# Patient Record
Sex: MALE | Race: WHITE | NOT HISPANIC OR LATINO | Employment: OTHER | URBAN - METROPOLITAN AREA
[De-identification: names, ages, dates, MRNs, and addresses within clinical notes are randomized per-mention and may not be internally consistent; named-entity substitution may affect disease eponyms.]

---

## 2018-03-16 ENCOUNTER — OFFICE VISIT (OUTPATIENT)
Dept: ENDOCRINOLOGY | Facility: HOSPITAL | Age: 48
End: 2018-03-16
Payer: MEDICARE

## 2018-03-16 VITALS
HEIGHT: 73 IN | WEIGHT: 198.4 LBS | HEART RATE: 76 BPM | DIASTOLIC BLOOD PRESSURE: 66 MMHG | SYSTOLIC BLOOD PRESSURE: 110 MMHG | BODY MASS INDEX: 26.29 KG/M2

## 2018-03-16 DIAGNOSIS — E11.69 TYPE 2 DIABETES MELLITUS WITH OTHER SPECIFIED COMPLICATION, WITH LONG-TERM CURRENT USE OF INSULIN (HCC): ICD-10-CM

## 2018-03-16 DIAGNOSIS — E78.5 HYPERLIPIDEMIA, UNSPECIFIED HYPERLIPIDEMIA TYPE: ICD-10-CM

## 2018-03-16 DIAGNOSIS — I10 HYPERTENSION, UNSPECIFIED TYPE: Primary | ICD-10-CM

## 2018-03-16 DIAGNOSIS — E11.49 OTHER DIABETIC NEUROLOGICAL COMPLICATION ASSOCIATED WITH TYPE 2 DIABETES MELLITUS (HCC): ICD-10-CM

## 2018-03-16 DIAGNOSIS — Z79.4 TYPE 2 DIABETES MELLITUS WITH OTHER SPECIFIED COMPLICATION, WITH LONG-TERM CURRENT USE OF INSULIN (HCC): ICD-10-CM

## 2018-03-16 PROBLEM — E11.40 DIABETIC NEUROPATHY ASSOCIATED WITH TYPE 2 DIABETES MELLITUS (HCC): Status: ACTIVE | Noted: 2018-03-16

## 2018-03-16 PROBLEM — E11.9 LONG-TERM INSULIN USE IN TYPE 2 DIABETES (HCC): Status: ACTIVE | Noted: 2018-03-16

## 2018-03-16 PROCEDURE — 99214 OFFICE O/P EST MOD 30 MIN: CPT | Performed by: INTERNAL MEDICINE

## 2018-03-16 RX ORDER — INSULIN GLARGINE 100 [IU]/ML
16 INJECTION, SOLUTION SUBCUTANEOUS
Qty: 5 PEN | Refills: 3 | Status: SHIPPED | OUTPATIENT
Start: 2018-03-16 | End: 2018-05-16 | Stop reason: SDUPTHER

## 2018-03-16 RX ORDER — SIMVASTATIN 40 MG
TABLET ORAL
COMMUNITY
Start: 2018-02-13 | End: 2018-08-03 | Stop reason: SDUPTHER

## 2018-03-16 RX ORDER — CALCIUM CITRATE/VITAMIN D3 200MG-6.25
TABLET ORAL
Qty: 200 EACH | Refills: 6 | Status: SHIPPED | OUTPATIENT
Start: 2018-03-16 | End: 2018-11-15 | Stop reason: SDUPTHER

## 2018-03-16 RX ORDER — CITALOPRAM 20 MG/1
20 TABLET ORAL DAILY
COMMUNITY
Start: 2018-03-15

## 2018-03-16 RX ORDER — HYDROXYZINE PAMOATE 50 MG/1
50 CAPSULE ORAL
Refills: 0 | COMMUNITY
Start: 2018-02-24 | End: 2019-03-20 | Stop reason: ALTCHOICE

## 2018-03-16 RX ORDER — INSULIN GLARGINE 100 [IU]/ML
INJECTION, SOLUTION SUBCUTANEOUS
COMMUNITY
Start: 2018-02-14 | End: 2018-03-16 | Stop reason: SDUPTHER

## 2018-03-16 RX ORDER — INSULIN LISPRO 100 [IU]/ML
INJECTION, SOLUTION INTRAVENOUS; SUBCUTANEOUS
COMMUNITY
Start: 2018-03-10 | End: 2018-03-16 | Stop reason: SDUPTHER

## 2018-03-16 RX ORDER — MULTIVITAMIN
1 TABLET ORAL DAILY
COMMUNITY
End: 2019-03-20 | Stop reason: ALTCHOICE

## 2018-03-16 RX ORDER — PIOGLITAZONEHYDROCHLORIDE 15 MG/1
TABLET ORAL
COMMUNITY
Start: 2018-02-13 | End: 2018-03-16 | Stop reason: SDUPTHER

## 2018-03-16 RX ORDER — LISINOPRIL 10 MG/1
TABLET ORAL
COMMUNITY
Start: 2018-02-13 | End: 2018-11-15 | Stop reason: SDUPTHER

## 2018-03-16 RX ORDER — PIOGLITAZONEHYDROCHLORIDE 15 MG/1
15 TABLET ORAL DAILY
Qty: 90 TABLET | Refills: 3 | Status: SHIPPED | OUTPATIENT
Start: 2018-03-16 | End: 2018-11-15 | Stop reason: SDUPTHER

## 2018-03-16 RX ORDER — INSULIN LISPRO 100 [IU]/ML
INJECTION, SOLUTION INTRAVENOUS; SUBCUTANEOUS
Qty: 5 PEN | Refills: 3 | Status: SHIPPED | OUTPATIENT
Start: 2018-03-16 | End: 2018-08-10 | Stop reason: SDUPTHER

## 2018-03-16 RX ORDER — CLONAZEPAM 0.5 MG/1
2 TABLET ORAL
COMMUNITY
Start: 2018-03-15

## 2018-03-16 RX ORDER — PEN NEEDLE, DIABETIC 31 GX5/16"
NEEDLE, DISPOSABLE MISCELLANEOUS
COMMUNITY
Start: 2018-01-11 | End: 2018-07-31 | Stop reason: SDUPTHER

## 2018-03-16 NOTE — PATIENT INSTRUCTIONS
Get blood work now no fasting  Increase lantus insulin to 16 units daily  Continue the same Humalog doses  Continue the pioglitazone 15 mg daily  Decrease the metformin to 500 mg twice a day  Can use 1/2 of 1000mg pill or a whole 500mg pill  We are decreasing metformin due to diarrhea  Continue checking blood sugars 3-4 times a day  If they go up, can call in blood sugars so we can adjust medicines  Follow up in 3 months with fasting  blood work

## 2018-03-16 NOTE — PROGRESS NOTES
3/16/2018    Assessment/Plan      Diagnoses and all orders for this visit:    Hypertension, unspecified type  -     Comprehensive metabolic panel Lab Collect; Future  -     Comprehensive metabolic panel Lab Collect    Type 2 diabetes mellitus with other specified complication, with long-term current use of insulin (HCC)  -     HEMOGLOBIN A1C W/ EAG ESTIMATION Lab Collect; Future  -     Comprehensive metabolic panel Lab Collect; Future  -     Comprehensive metabolic panel Lab Collect  -     HEMOGLOBIN A1C W/ EAG ESTIMATION Lab Collect  -     Lipid panel Lab Collect Lab Collect; Future  -     Microalbumin / creatinine urine ratio Lab Collect; Future  -     TSH, 3rd generation Lab Collect; Future  -     UA (URINE) with reflex to Microscopic- Lab Collect; Future  -     metFORMIN (GLUCOPHAGE) 500 mg tablet; Take 1 tablet (500 mg total) by mouth 2 (two) times a day with meals  -     pioglitazone (ACTOS) 15 mg tablet; Take 1 tablet (15 mg total) by mouth daily  -     HUMALOG KWIKPEN 100 UNIT/ML SOPN; Use 4-6 units with each meal and sliding scale if high  -     LANTUS SOLOSTAR injection pen 100 units/mL; Inject 0 16 mL (16 Units total) under the skin daily at bedtime 12-14 units at night  -     TRUE METRIX BLOOD GLUCOSE TEST test strip; Use as instructed up to 4 times a day    Hyperlipidemia, unspecified hyperlipidemia type  -     Lipid panel Lab Collect Lab Collect; Future    Other diabetic neurological complication associated with type 2 diabetes mellitus ([facility] )  -     HEMOGLOBIN A1C W/ EAG ESTIMATION Lab Collect; Future  -     Comprehensive metabolic panel Lab Collect; Future  -     Comprehensive metabolic panel Lab Collect  -     HEMOGLOBIN A1C W/ EAG ESTIMATION Lab Collect  -     Lipid panel Lab Collect Lab Collect; Future  -     Microalbumin / creatinine urine ratio Lab Collect; Future  -     TSH, 3rd generation Lab Collect; Future  -     UA (URINE) with reflex to Microscopic- Lab Collect;  Future  -     metFORMIN (GLUCOPHAGE) 500 mg tablet; Take 1 tablet (500 mg total) by mouth 2 (two) times a day with meals  -     pioglitazone (ACTOS) 15 mg tablet; Take 1 tablet (15 mg total) by mouth daily  -     HUMALOG KWIKPEN 100 UNIT/ML SOPN; Use 4-6 units with each meal and sliding scale if high  -     LANTUS SOLOSTAR injection pen 100 units/mL; Inject 0 16 mL (16 Units total) under the skin daily at bedtime 12-14 units at night  -     TRUE METRIX BLOOD GLUCOSE TEST test strip; Use as instructed up to 4 times a day    Other orders  -     lisinopril (ZESTRIL) 10 mg tablet;   -     Discontinue: metFORMIN (GLUCOPHAGE) 1000 MG tablet;   -     Discontinue: LANTUS SOLOSTAR injection pen 100 units/mL; 12-14 units at night   -     Discontinue: HUMALOG KWIKPEN 100 UNIT/ML SOPN; Sliding scale   -     simvastatin (ZOCOR) 40 mg tablet;   -     citalopram (CeleXA) 20 mg tablet; Take 20 mg by mouth daily    -     clonazePAM (KlonoPIN) 0 5 mg tablet; 0 5 mg 2 (two) times a day    -     hydrOXYzine pamoate (VISTARIL) 50 mg capsule; Take 50 mg by mouth daily at bedtime  -     EASY TOUCH PEN NEEDLES 31G X 8 MM MISC;   -     Discontinue: pioglitazone (ACTOS) 15 mg tablet;   -     Multiple Vitamin (MULTIVITAMIN) tablet; Take 1 tablet by mouth daily        Assessment/Plan:  1  Type 2 diabetes insulin requiring  I have no recent blood work  I will have him get a hemoglobin A1c and CMP now  Based on his blood sugars, I will increase his Lantus insulin to 16 units at night  He will continue the current Humalog insulin 4-6 units with each meal with a sliding scale  He will continue his Pioglitazone 15 mg daily  His metformin was increased while in the hospital to a 1000 mg twice a day and he is now having diarrhea, so I will decrease his metformin to 500 mg twice a day  He will continue to check his blood sugars 3 to 4 times a day  2   Diabetic neuropathy  He currently has no symptoms referable to diabetic neuropathy    We will follow these symptoms over time  3   Hyperlipidemia  He will continue his current simvastatin 40 mg a day and  we will recheck his lipids next visit  4   Hypertension  His blood pressure is under good control on his current dose of lisinopril  I will have him do a hemoglobin A1c and CMP now  I will have him follow up in 3 months with preceding hemoglobin A1c, CMP, TSH, lipid profile, urine microalbumin to creatinine ratio, and urinalysis  CC: Diabetes Consult    History of Present Illness     HPI: Len Bertrand is a 56y o  year old male with type 2 diabetes for at least 23 years years  He is on oral agents and insulin at home and takes Lantus insulin 14 units at hs, and Humalog insulin 4-6 units with each meal since last week(Hospital had him only on Humalog scale), Pioglitazone 15 mg daily, and Metformin 100 mg BID  He denies any polyuria, polydipsia, nocturia and blurry vision  He has no nocturia  He denies chest pain and shortness of breath  He has no numbness or tingling of the feet  He has no polyphagia  His appetite had actually been going down over the last 6 months  He has lost 19 lb since December of 2017 and had lost 19 lb the 6 months prior to that  He denies nephropathy, retinopathy, heart attack, stroke and claudication but does admit to neuropathy  Was in hospital early feb 2018 to 2/13/18  Insulin was changed in the hospital     Hypoglycemic episodes: No rare  H/o of hypoglycemia causing hospitalization or intervention such as glucagon injection  or ambulance call  No   Hypoglycemia symptoms: dizziness and jitteriness  Treatment of hypoglycemia: eats anything with sugar  Glucagon:No   Medic alert tag: recommended,Yes  The patient's last eye exam was in January 2017 with retinopathy in the past   The patient's last foot exam was in today, not seen for 1 year  Last A1C was December 1, 2017 and was 7 1%  Blood Sugar/Glucometer/Pump/CGM review: Checks blood sugars 3-4 times a day  No record brought with him today  Range of blood sugars 118-300  300s can be sporadic  Before breakfast: mid 100s  Before lunch: 200s in scale, now better now on regular humalog dosage  Before dinner: 200s on scale, better now on regular humalog doasage  Bedtime: 100-200    Review of Systems   Constitutional: Positive for fatigue and unexpected weight change  Negative for activity change and appetite change  19 lbs since December 2017, had 19 lbs prior to that with med changes  Not exercising as much  Fatigue since hospitalization  HENT: Negative for ear pain, hearing loss, tinnitus and trouble swallowing  Eyes: Negative for visual disturbance  Respiratory: Negative for cough, choking, chest tightness and shortness of breath  Cardiovascular: Negative for chest pain, palpitations and leg swelling  Gastrointestinal: Positive for diarrhea  Negative for abdominal pain, constipation and nausea  Has had diarrhea since metformin increased in the hospital    Endocrine: Negative for polydipsia, polyphagia and polyuria  No nocturia  Musculoskeletal: Positive for back pain  Negative for arthralgias  Some upper back pain  Neurological: Positive for dizziness and light-headedness  Negative for tremors, weakness, numbness and headaches  Some lightheaded and dizzy at times  Psychiatric/Behavioral: Positive for sleep disturbance  Sleeping bad as he wakes a lot  Saw psych doctor yesterday, to start a new medicine for Mental disorder         Historical Information   Past Medical History:   Diagnosis Date    Depression     Schizophrenia [facility])      Past Surgical History:   Procedure Laterality Date    TOOTH EXTRACTION       Social History   History   Alcohol Use No     History   Drug Use No     History   Smoking Status    Current Every Day Smoker    Packs/day: 0 50    Types: Cigarettes   Smokeless Tobacco    Never Used     Family History:   Family History   Problem Relation Age of Onset  Breast cancer Mother     Diabetes unspecified Father     Hypertension Father     No Known Problems Brother     Diabetes unspecified Paternal Aunt     No Known Problems Brother        Meds/Allergies   Current Outpatient Prescriptions   Medication Sig Dispense Refill    citalopram (CeleXA) 20 mg tablet Take 20 mg by mouth daily        clonazePAM (KlonoPIN) 0 5 mg tablet 0 5 mg 2 (two) times a day        EASY TOUCH PEN NEEDLES 31G X 8 MM MISC       HUMALOG KWIKPEN 100 UNIT/ML SOPN Use 4-6 units with each meal and sliding scale if high 5 pen 3    hydrOXYzine pamoate (VISTARIL) 50 mg capsule Take 50 mg by mouth daily at bedtime  0    LANTUS SOLOSTAR injection pen 100 units/mL Inject 0 16 mL (16 Units total) under the skin daily at bedtime 12-14 units at night 5 pen 3    lisinopril (ZESTRIL) 10 mg tablet       metFORMIN (GLUCOPHAGE) 500 mg tablet Take 1 tablet (500 mg total) by mouth 2 (two) times a day with meals 180 tablet 3    Multiple Vitamin (MULTIVITAMIN) tablet Take 1 tablet by mouth daily      pioglitazone (ACTOS) 15 mg tablet Take 1 tablet (15 mg total) by mouth daily 90 tablet 3    simvastatin (ZOCOR) 40 mg tablet       TRUE METRIX BLOOD GLUCOSE TEST test strip Use as instructed up to 4 times a day 200 each 6     No current facility-administered medications for this visit  No Known Allergies    Objective   Vitals: Blood pressure 110/66, pulse 76, height 6' 1" (1 854 m), weight 90 kg (198 lb 6 4 oz)  Invasive Devices          No matching active lines, drains, or airways          Physical Exam   Constitutional: He is oriented to person, place, and time  He appears well-developed and well-nourished  HENT:   Head: Normocephalic  Eyes: Conjunctivae and EOM are normal  Pupils are equal, round, and reactive to light  No lid lag, stare, proptosis, or periorbital edema  Neck: Normal range of motion  Neck supple  No thyromegaly present  No carotid bruits     Cardiovascular: Normal rate, regular rhythm, normal heart sounds and intact distal pulses  No murmur heard  Pulmonary/Chest: Effort normal and breath sounds normal  He has no wheezes  Abdominal: Soft  Bowel sounds are normal  There is no tenderness  Musculoskeletal: Normal range of motion  He exhibits no edema or deformity  No ulcerations  Callus thick on medial 1st toe  No CVAT  No tremor of the outstretched hands  No spinous process tenderness  Lymphadenopathy:     He has no cervical adenopathy  Neurological: He is alert and oriented to person, place, and time  He has normal reflexes  Vibration sensation diminished to the bilateral DIP  Skin: Skin is warm and dry  No rash noted  Vitals reviewed  The history was obtained from the review of the chart and from the patient  Lab Results:   I have no recent blood work  Last blood work was done December 1, 2017 and showed hemoglobin A1c of 7 2% and a CMP that was normal except for glucose of 195 random  Blood work in June of 2017 showed hemoglobin A1c of 6 9%, total cholesterol 157, triglyceride 79, HDL 44, LDL 97 and a TSH of 2 17  Last urine microalbumin to creatinine ratio was in February of 2017 and was normal at 6  Urinalysis at that time showed no blood  No results found for this or any previous visit (from the past 99979 hour(s))        Future Appointments  Date Time Provider Petra Cutler   6/19/2018 10:45 AM NACHO Cerda [facility]

## 2018-03-16 NOTE — LETTER
March 16, 2018     Luana Matt DO  [address]  [place] [address]    Patient: Fred Waggoner   YOB: 1970   Date of Visit: 3/16/2018       Dear Dr Vicki Quintero:    Thank you for referring Fred Waggoner to me for evaluation  Below are my notes for this consultation  If you have questions, please do not hesitate to call me  I look forward to following your patient along with you  Sincerely,        Maribel Luo MD        CC: No Recipients  Maribel Luo MD  3/16/2018  2:32 PM  Sign at close encounter  3/16/2018    Assessment/Plan      Diagnoses and all orders for this visit:    Hypertension, unspecified type  -     Comprehensive metabolic panel Lab Collect; Future  -     Comprehensive metabolic panel Lab Collect    Type 2 diabetes mellitus with other specified complication, with long-term current use of insulin (HCC)  -     HEMOGLOBIN A1C W/ EAG ESTIMATION Lab Collect; Future  -     Comprehensive metabolic panel Lab Collect; Future  -     Comprehensive metabolic panel Lab Collect  -     HEMOGLOBIN A1C W/ EAG ESTIMATION Lab Collect  -     Lipid panel Lab Collect Lab Collect; Future  -     Microalbumin / creatinine urine ratio Lab Collect; Future  -     TSH, 3rd generation Lab Collect; Future  -     UA (URINE) with reflex to Microscopic- Lab Collect; Future  -     metFORMIN (GLUCOPHAGE) 500 mg tablet; Take 1 tablet (500 mg total) by mouth 2 (two) times a day with meals  -     pioglitazone (ACTOS) 15 mg tablet; Take 1 tablet (15 mg total) by mouth daily  -     HUMALOG KWIKPEN 100 UNIT/ML SOPN; Use 4-6 units with each meal and sliding scale if high  -     LANTUS SOLOSTAR injection pen 100 units/mL; Inject 0 16 mL (16 Units total) under the skin daily at bedtime 12-14 units at night  -     TRUE METRIX BLOOD GLUCOSE TEST test strip; Use as instructed up to 4 times a day    Hyperlipidemia, unspecified hyperlipidemia type  -     Lipid panel Lab Collect Lab Collect;  Future    Other diabetic neurological complication associated with type 2 diabetes mellitus ([facility] [address] )  -     HEMOGLOBIN A1C W/ EAG ESTIMATION Lab Collect; Future  -     Comprehensive metabolic panel Lab Collect; Future  -     Comprehensive metabolic panel Lab Collect  -     HEMOGLOBIN A1C W/ EAG ESTIMATION Lab Collect  -     Lipid panel Lab Collect Lab Collect; Future  -     Microalbumin / creatinine urine ratio Lab Collect; Future  -     TSH, 3rd generation Lab Collect; Future  -     UA (URINE) with reflex to Microscopic- Lab Collect; Future  -     metFORMIN (GLUCOPHAGE) 500 mg tablet; Take 1 tablet (500 mg total) by mouth 2 (two) times a day with meals  -     pioglitazone (ACTOS) 15 mg tablet; Take 1 tablet (15 mg total) by mouth daily  -     HUMALOG KWIKPEN 100 UNIT/ML SOPN; Use 4-6 units with each meal and sliding scale if high  -     LANTUS SOLOSTAR injection pen 100 units/mL; Inject 0 16 mL (16 Units total) under the skin daily at bedtime 12-14 units at night  -     TRUE METRIX BLOOD GLUCOSE TEST test strip; Use as instructed up to 4 times a day    Other orders  -     lisinopril (ZESTRIL) 10 mg tablet;   -     Discontinue: metFORMIN (GLUCOPHAGE) 1000 MG tablet;   -     Discontinue: LANTUS SOLOSTAR injection pen 100 units/mL; 12-14 units at night   -     Discontinue: HUMALOG KWIKPEN 100 UNIT/ML SOPN; Sliding scale   -     simvastatin (ZOCOR) 40 mg tablet;   -     citalopram (CeleXA) 20 mg tablet; Take 20 mg by mouth daily    -     clonazePAM (KlonoPIN) 0 5 mg tablet; 0 5 mg 2 (two) times a day    -     hydrOXYzine pamoate (VISTARIL) 50 mg capsule; Take 50 mg by mouth daily at bedtime  -     EASY TOUCH PEN NEEDLES 31G X 8 MM MISC;   -     Discontinue: pioglitazone (ACTOS) 15 mg tablet;   -     Multiple Vitamin (MULTIVITAMIN) tablet; Take 1 tablet by mouth daily        Assessment/Plan:  1  Type 2 diabetes insulin requiring  I have no recent blood work  I will have him get a hemoglobin A1c and CMP now    Based on his blood sugars, I will increase his Lantus insulin to 16 units at night  He will continue the current Humalog insulin 4-6 units with each meal with a sliding scale  He will continue his Pioglitazone 15 mg daily  His metformin was increased while in the hospital to a 1000 mg twice a day and he is now having diarrhea, so I will decrease his metformin to 500 mg twice a day  He will continue to check his blood sugars 3 to 4 times a day  2   Diabetic neuropathy  He currently has no symptoms referable to diabetic neuropathy  We will follow these symptoms over time  3   Hyperlipidemia  He will continue his current simvastatin 40 mg a day and  we will recheck his lipids next visit  4   Hypertension  His blood pressure is under good control on his current dose of lisinopril  I will have him do a hemoglobin A1c and CMP now  I will have him follow up in 3 months with preceding hemoglobin A1c, CMP, TSH, lipid profile, urine microalbumin to creatinine ratio, and urinalysis  CC: Diabetes Consult    History of Present Illness     HPI: Tyrese Juarez is a 56y o  year old male with type 2 diabetes for at least 23 years years  He is on oral agents and insulin at home and takes Lantus insulin 14 units at hs, and Humalog insulin 4-6 units with each meal since last week(Hospital had him only on Humalog scale), Pioglitazone 15 mg daily, and Metformin 100 mg BID  He denies any polyuria, polydipsia, nocturia and blurry vision  He has no nocturia  He denies chest pain and shortness of breath  He has no numbness or tingling of the feet  He has no polyphagia  His appetite had actually been going down over the last 6 months  He has lost 19 lb since December of 2017 and had lost 19 lb the 6 months prior to that  He denies nephropathy, retinopathy, heart attack, stroke and claudication but does admit to neuropathy  Was in hospital early feb 2018 to 2/13/18  Insulin was changed in the hospital     Hypoglycemic episodes: No rare    H/o of hypoglycemia causing hospitalization or intervention such as glucagon injection  or ambulance call  No   Hypoglycemia symptoms: dizziness and jitteriness  Treatment of hypoglycemia: eats anything with sugar  Glucagon:No   Medic alert tag: recommended,Yes  The patient's last eye exam was in January 2017 with retinopathy in the past   The patient's last foot exam was in today, not seen for 1 year  Last A1C was December 1, 2017 and was 7 1%  Blood Sugar/Glucometer/Pump/CGM review: Checks blood sugars 3-4 times a day  No record brought with him today  Range of blood sugars 118-300  300s can be sporadic  Before breakfast: mid 100s  Before lunch: 200s in scale, now better now on regular humalog dosage  Before dinner: 200s on scale, better now on regular humalog doasage  Bedtime: 100-200    Review of Systems   Constitutional: Positive for fatigue and unexpected weight change  Negative for activity change and appetite change  19 lbs since December 2017, had 19 lbs prior to that with med changes  Not exercising as much  Fatigue since hospitalization  HENT: Negative for ear pain, hearing loss, tinnitus and trouble swallowing  Eyes: Negative for visual disturbance  Respiratory: Negative for cough, choking, chest tightness and shortness of breath  Cardiovascular: Negative for chest pain, palpitations and leg swelling  Gastrointestinal: Positive for diarrhea  Negative for abdominal pain, constipation and nausea  Has had diarrhea since metformin increased in the hospital    Endocrine: Negative for polydipsia, polyphagia and polyuria  No nocturia  Musculoskeletal: Positive for back pain  Negative for arthralgias  Some upper back pain  Neurological: Positive for dizziness and light-headedness  Negative for tremors, weakness, numbness and headaches  Some lightheaded and dizzy at times  Psychiatric/Behavioral: Positive for sleep disturbance          Sleeping bad as he wakes a lot  Saw psych doctor yesterday, to start a new medicine for Mental disorder  Historical Information   Past Medical History:   Diagnosis Date    Depression     Schizophrenia ([facility] [address] )      Past Surgical History:   Procedure Laterality Date    TOOTH EXTRACTION       Social History   History   Alcohol Use No     History   Drug Use No     History   Smoking Status    Current Every Day Smoker    Packs/day: 0 50    Types: Cigarettes   Smokeless Tobacco    Never Used     Family History:   Family History   Problem Relation Age of Onset    Breast cancer Mother     Diabetes unspecified Father     Hypertension Father     No Known Problems Brother     Diabetes unspecified Paternal Aunt     No Known Problems Brother        Meds/Allergies   Current Outpatient Prescriptions   Medication Sig Dispense Refill    citalopram (CeleXA) 20 mg tablet Take 20 mg by mouth daily        clonazePAM (KlonoPIN) 0 5 mg tablet 0 5 mg 2 (two) times a day        EASY TOUCH PEN NEEDLES 31G X 8 MM MISC       HUMALOG KWIKPEN 100 UNIT/ML SOPN Use 4-6 units with each meal and sliding scale if high 5 pen 3    hydrOXYzine pamoate (VISTARIL) 50 mg capsule Take 50 mg by mouth daily at bedtime  0    LANTUS SOLOSTAR injection pen 100 units/mL Inject 0 16 mL (16 Units total) under the skin daily at bedtime 12-14 units at night 5 pen 3    lisinopril (ZESTRIL) 10 mg tablet       metFORMIN (GLUCOPHAGE) 500 mg tablet Take 1 tablet (500 mg total) by mouth 2 (two) times a day with meals 180 tablet 3    Multiple Vitamin (MULTIVITAMIN) tablet Take 1 tablet by mouth daily      pioglitazone (ACTOS) 15 mg tablet Take 1 tablet (15 mg total) by mouth daily 90 tablet 3    simvastatin (ZOCOR) 40 mg tablet       TRUE METRIX BLOOD GLUCOSE TEST test strip Use as instructed up to 4 times a day 200 each 6     No current facility-administered medications for this visit        No Known Allergies    Objective   Vitals: Blood pressure 110/66, pulse 76, height 6' 1" (1 854 m), weight 90 kg (198 lb 6 4 oz)  Invasive Devices          No matching active lines, drains, or airways          Physical Exam   Constitutional: He is oriented to person, place, and time  He appears well-developed and well-nourished  HENT:   Head: Normocephalic  Eyes: Conjunctivae and EOM are normal  Pupils are equal, round, and reactive to light  No lid lag, stare, proptosis, or periorbital edema  Neck: Normal range of motion  Neck supple  No thyromegaly present  No carotid bruits  Cardiovascular: Normal rate, regular rhythm, normal heart sounds and intact distal pulses  No murmur heard  Pulmonary/Chest: Effort normal and breath sounds normal  He has no wheezes  Abdominal: Soft  Bowel sounds are normal  There is no tenderness  Musculoskeletal: Normal range of motion  He exhibits no edema or deformity  No ulcerations  Callus thick on medial 1st toe  No CVAT  No tremor of the outstretched hands  No spinous process tenderness  Lymphadenopathy:     He has no cervical adenopathy  Neurological: He is alert and oriented to person, place, and time  He has normal reflexes  Vibration sensation diminished to the bilateral DIP  Skin: Skin is warm and dry  No rash noted  Vitals reviewed  The history was obtained from the review of the chart and from the patient  Lab Results:   I have no recent blood work  Last blood work was done December 1, 2017 and showed hemoglobin A1c of 7 2% and a CMP that was normal except for glucose of 195 random  Blood work in June of 2017 showed hemoglobin A1c of 6 9%, total cholesterol 157, triglyceride 79, HDL 44, LDL 97 and a TSH of 2 17  Last urine microalbumin to creatinine ratio was in February of 2017 and was normal at 6  Urinalysis at that time showed no blood  No results found for this or any previous visit (from the past 98446 hour(s))        Future Appointments  Date Time Provider Petra Cutler   6/19/2018 10:45 AM Ellis Dorsey Elias, [address]  [facility]

## 2018-03-27 ENCOUNTER — TELEPHONE (OUTPATIENT)
Dept: ENDOCRINOLOGY | Facility: HOSPITAL | Age: 48
End: 2018-03-27

## 2018-04-17 ENCOUNTER — TELEPHONE (OUTPATIENT)
Dept: ENDOCRINOLOGY | Facility: HOSPITAL | Age: 48
End: 2018-04-17

## 2018-05-16 DIAGNOSIS — Z79.4 TYPE 2 DIABETES MELLITUS WITH OTHER SPECIFIED COMPLICATION, WITH LONG-TERM CURRENT USE OF INSULIN (HCC): ICD-10-CM

## 2018-05-16 DIAGNOSIS — E11.49 OTHER DIABETIC NEUROLOGICAL COMPLICATION ASSOCIATED WITH TYPE 2 DIABETES MELLITUS (HCC): ICD-10-CM

## 2018-05-16 DIAGNOSIS — E11.69 TYPE 2 DIABETES MELLITUS WITH OTHER SPECIFIED COMPLICATION, WITH LONG-TERM CURRENT USE OF INSULIN (HCC): ICD-10-CM

## 2018-05-16 RX ORDER — INSULIN GLARGINE 100 [IU]/ML
18 INJECTION, SOLUTION SUBCUTANEOUS
Qty: 5 PEN | Refills: 1 | Status: SHIPPED | OUTPATIENT
Start: 2018-05-16 | End: 2018-08-01 | Stop reason: SDUPTHER

## 2018-06-22 LAB — HBA1C MFR BLD HPLC: 8.3 %

## 2018-06-28 ENCOUNTER — OFFICE VISIT (OUTPATIENT)
Dept: ENDOCRINOLOGY | Facility: HOSPITAL | Age: 48
End: 2018-06-28
Payer: MEDICARE

## 2018-06-28 VITALS
HEIGHT: 73 IN | WEIGHT: 205.4 LBS | DIASTOLIC BLOOD PRESSURE: 60 MMHG | HEART RATE: 80 BPM | BODY MASS INDEX: 27.22 KG/M2 | SYSTOLIC BLOOD PRESSURE: 102 MMHG

## 2018-06-28 DIAGNOSIS — E78.5 HYPERLIPIDEMIA, UNSPECIFIED HYPERLIPIDEMIA TYPE: ICD-10-CM

## 2018-06-28 DIAGNOSIS — I10 HYPERTENSION, UNSPECIFIED TYPE: ICD-10-CM

## 2018-06-28 DIAGNOSIS — E11.69 TYPE 2 DIABETES MELLITUS WITH OTHER SPECIFIED COMPLICATION, WITH LONG-TERM CURRENT USE OF INSULIN (HCC): Primary | ICD-10-CM

## 2018-06-28 DIAGNOSIS — Z79.4 TYPE 2 DIABETES MELLITUS WITH OTHER SPECIFIED COMPLICATION, WITH LONG-TERM CURRENT USE OF INSULIN (HCC): Primary | ICD-10-CM

## 2018-06-28 PROCEDURE — 99214 OFFICE O/P EST MOD 30 MIN: CPT | Performed by: NURSE PRACTITIONER

## 2018-06-28 NOTE — PROGRESS NOTES
Isabella Gonzalez 53 y o  male MRN: [id number]    Encounter: [id number]      Assessment/Plan     Assessment: This is a 53y o -year-old male with type 2 diabetes with neuropathy and long-term insulin use, hypertension hyperlipidemia  Plan:  1  Type 2 diabetes insulin requiring:  His most recent hemoglobin A1c is 8 3  He presents with no blood sugar records or meter for download  He would like to try taking metformin 1000 mg twice daily  Should this cause gastrointestinal upset as it had in the past, he will contact the office  I have asked him to check his blood sugars 4 times daily and for the record to the office in 1-2 weeks for review and further adjustment, if necessary  Discussed a proper diabetic diet  Reviewed recognition and proper treatment of hypoglycemic episodes  Check hemoglobin A1c prior to next visit  2   Diabetic neuropathy  He currently has no symptoms referable to diabetic neuropathy  he will continue to follow up with Podiatry for regular diabetic foot care  3  Hyperlipidemia:  Stable  Continue simvastatin  4   Hypertension:  He is normotensive in the office today  Continue lisinopril  Check comprehensive metabolic panel and urine microalbumin prior to next visit  CC:  Type 2 Diabetes follow-up    History of Present Illness     HPI:  53y o  year old male with type 2 diabetes for at least 23 years years  He is on oral agents and insulin at home and takes Lantus insulin 18 units at hs, and Humalog insulin 4-6 units with each meal, Pioglitazone 15 mg daily, and Metformin 500 mg BID  He denies any recent episodes of hypoglycemia, polyuria, polydipsia, nocturia and blurry vision  He has no nocturia  He presents with no blood sugar records or meter for download in the office today  His most recent hemoglobin A1c from June 22, 2018 is 8 3  He denies chest pain and shortness of breath  He has no numbness or tingling of the feet  He has no polyphagia   He denies nephropathy, retinopathy, heart attack, stroke and claudication but does admit to neuropathy  he was hospitalized in early February 2018 and his insulin regimen was changed while hospitalized      The patient's last eye exam was in March 2018  He has no complaints about his feet and states he follows podiatry regularly for regular diabetic foot care  For his hypertension, he is treated with lisinopril 10 mg daily  His hyperlipidemia is treated with simvastatin 40 mg daily  His most recent fasting lipid panel from June 22, 2018 reveals a total cholesterol of 176, HDL of 56, triglycerides of 69 and LDL of 104  Review of Systems   Constitutional: Negative  Negative for chills, fatigue and fever  HENT: Negative  Eyes: Negative  Respiratory: Negative for cough and shortness of breath  Cardiovascular: Negative for chest pain and palpitations  Gastrointestinal: Negative for abdominal pain, constipation, diarrhea, nausea and vomiting  Endocrine: Positive for polydipsia and polyuria  Negative for cold intolerance, heat intolerance and polyphagia  Genitourinary: Negative  Musculoskeletal: Negative  Skin: Negative  Allergic/Immunologic: Negative  Neurological: Positive for headaches (chronic)  Negative for dizziness, syncope and light-headedness  Hematological: Negative  Psychiatric/Behavioral: Negative  All other systems reviewed and are negative        Historical Information   Past Medical History:   Diagnosis Date    Depression     Schizophrenia [facility])      Past Surgical History:   Procedure Laterality Date    TOOTH EXTRACTION       Social History   History   Alcohol Use No     History   Drug Use No     History   Smoking Status    Current Every Day Smoker    Packs/day: 0 50    Types: Cigarettes   Smokeless Tobacco    Never Used     Family History:   Family History   Problem Relation Age of Onset   Avery Pandey Breast cancer Mother     Diabetes unspecified Father     Hypertension Father  No Known Problems Brother     Diabetes unspecified Paternal Aunt     No Known Problems Brother        Meds/Allergies   Current Outpatient Prescriptions   Medication Sig Dispense Refill    citalopram (CeleXA) 20 mg tablet Take 20 mg by mouth daily        clonazePAM (KlonoPIN) 0 5 mg tablet 0 5 mg 2 (two) times a day        EASY TOUCH PEN NEEDLES 31G X 8 MM MISC       HUMALOG KWIKPEN 100 UNIT/ML SOPN Use 4-6 units with each meal and sliding scale if high 5 pen 3    hydrOXYzine pamoate (VISTARIL) 50 mg capsule Take 50 mg by mouth daily at bedtime  0    LANTUS SOLOSTAR injection pen 100 units/mL Inject 0 18 mL (18 Units total) under the skin daily at bedtime 12-14 units at night 5 pen 1    lisinopril (ZESTRIL) 10 mg tablet       metFORMIN (GLUCOPHAGE) 500 mg tablet Take 1 tablet (500 mg total) by mouth 2 (two) times a day with meals 180 tablet 3    Multiple Vitamin (MULTIVITAMIN) tablet Take 1 tablet by mouth daily      pioglitazone (ACTOS) 15 mg tablet Take 1 tablet (15 mg total) by mouth daily 90 tablet 3    simvastatin (ZOCOR) 40 mg tablet       TRUE METRIX BLOOD GLUCOSE TEST test strip Use as instructed up to 4 times a day 200 each 6     No current facility-administered medications for this visit  No Known Allergies    Objective   Vitals: Blood pressure 102/60, pulse 80, height 6' 1" (1 854 m), weight 93 2 kg (205 lb 6 4 oz)  Physical Exam   Constitutional: He is oriented to person, place, and time  He appears well-developed and well-nourished  Thin build   HENT:   Head: Normocephalic and atraumatic  Nose: Nose normal    Mouth/Throat: Oropharynx is clear and moist    Eyes: Conjunctivae and EOM are normal  Pupils are equal, round, and reactive to light  Right eye exhibits no discharge  Left eye exhibits no discharge  Wears glasses   Neck: Normal range of motion  Neck supple  No JVD present  No tracheal deviation present  No thyromegaly present     Cardiovascular: Normal rate, regular rhythm, normal heart sounds and intact distal pulses  Pulmonary/Chest: Effort normal and breath sounds normal  No stridor  No respiratory distress  He has no wheezes  He has no rales  He exhibits no tenderness  Abdominal: Soft  Bowel sounds are normal  He exhibits no distension  There is no tenderness  Musculoskeletal: Normal range of motion  He exhibits no edema, tenderness or deformity  Lymphadenopathy:     He has no cervical adenopathy  Neurological: He is alert and oriented to person, place, and time  He displays normal reflexes  No cranial nerve deficit  Coordination normal    Skin: Skin is warm and dry  No rash noted  No erythema  No pallor  Dry skin   Psychiatric: He has a normal mood and affect  His behavior is normal  Judgment and thought content normal    Vitals reviewed  Portions of the record may have been created with voice recognition software  Occasional wrong word or "sound a like" substitutions may have occurred due to the inherent limitations of voice recognition software  Read the chart carefully and recognize, using context, where substitutions have occurred

## 2018-06-28 NOTE — PATIENT INSTRUCTIONS
Be mindful of diet  Exercise regularly and stay hydrated  Increase your Metformin to 1000 mg twice daily  For now, continue your current regimen of Levemir, Humalog and Pioglitazone  Check your blood sugars 3-4 times daily and for the record to the office in 1-2 weeks for review so that further adjustments can be made  Continue lisinopril  Continue simvastatin

## 2018-07-10 DIAGNOSIS — E11.69 TYPE 2 DIABETES MELLITUS WITH OTHER SPECIFIED COMPLICATION, WITH LONG-TERM CURRENT USE OF INSULIN (HCC): ICD-10-CM

## 2018-07-10 DIAGNOSIS — E11.49 OTHER DIABETIC NEUROLOGICAL COMPLICATION ASSOCIATED WITH TYPE 2 DIABETES MELLITUS (HCC): ICD-10-CM

## 2018-07-10 DIAGNOSIS — Z79.4 TYPE 2 DIABETES MELLITUS WITH OTHER SPECIFIED COMPLICATION, WITH LONG-TERM CURRENT USE OF INSULIN (HCC): ICD-10-CM

## 2018-07-31 DIAGNOSIS — E11.8 TYPE 2 DIABETES MELLITUS WITH COMPLICATION, UNSPECIFIED WHETHER LONG TERM INSULIN USE: Primary | ICD-10-CM

## 2018-07-31 RX ORDER — PEN NEEDLE, DIABETIC 31 GX5/16"
NEEDLE, DISPOSABLE MISCELLANEOUS
Qty: 200 EACH | Refills: 6 | Status: SHIPPED | OUTPATIENT
Start: 2018-07-31 | End: 2019-03-20 | Stop reason: SDUPTHER

## 2018-08-01 DIAGNOSIS — Z79.4 TYPE 2 DIABETES MELLITUS WITH OTHER SPECIFIED COMPLICATION, WITH LONG-TERM CURRENT USE OF INSULIN (HCC): ICD-10-CM

## 2018-08-01 DIAGNOSIS — E11.69 TYPE 2 DIABETES MELLITUS WITH OTHER SPECIFIED COMPLICATION, WITH LONG-TERM CURRENT USE OF INSULIN (HCC): ICD-10-CM

## 2018-08-01 DIAGNOSIS — E11.49 OTHER DIABETIC NEUROLOGICAL COMPLICATION ASSOCIATED WITH TYPE 2 DIABETES MELLITUS (HCC): ICD-10-CM

## 2018-08-01 RX ORDER — INSULIN GLARGINE 100 [IU]/ML
INJECTION, SOLUTION SUBCUTANEOUS
Qty: 5 PEN | Refills: 6 | Status: SHIPPED | OUTPATIENT
Start: 2018-08-01 | End: 2018-11-15 | Stop reason: SDUPTHER

## 2018-08-03 DIAGNOSIS — E78.00 HYPERCHOLESTEROLEMIA: Primary | ICD-10-CM

## 2018-08-03 RX ORDER — SIMVASTATIN 40 MG
40 TABLET ORAL
Qty: 30 TABLET | Refills: 3 | Status: SHIPPED | OUTPATIENT
Start: 2018-08-03 | End: 2018-11-15 | Stop reason: SDUPTHER

## 2018-08-10 DIAGNOSIS — E11.49 OTHER DIABETIC NEUROLOGICAL COMPLICATION ASSOCIATED WITH TYPE 2 DIABETES MELLITUS (HCC): ICD-10-CM

## 2018-08-10 DIAGNOSIS — E11.69 TYPE 2 DIABETES MELLITUS WITH OTHER SPECIFIED COMPLICATION, WITH LONG-TERM CURRENT USE OF INSULIN (HCC): ICD-10-CM

## 2018-08-10 DIAGNOSIS — Z79.4 TYPE 2 DIABETES MELLITUS WITH OTHER SPECIFIED COMPLICATION, WITH LONG-TERM CURRENT USE OF INSULIN (HCC): ICD-10-CM

## 2018-08-10 RX ORDER — INSULIN LISPRO 100 [IU]/ML
INJECTION, SOLUTION INTRAVENOUS; SUBCUTANEOUS
Qty: 15 ML | Refills: 0 | Status: SHIPPED | OUTPATIENT
Start: 2018-08-10 | End: 2018-11-15 | Stop reason: SDUPTHER

## 2018-10-16 ENCOUNTER — TELEPHONE (OUTPATIENT)
Dept: ENDOCRINOLOGY | Facility: HOSPITAL | Age: 48
End: 2018-10-16

## 2018-10-16 NOTE — TELEPHONE ENCOUNTER
Patient left message to schedule an appt and needs a new lab slip  Left message for him to call back

## 2018-11-03 LAB — HBA1C MFR BLD HPLC: 6.9 %

## 2018-11-15 ENCOUNTER — TELEPHONE (OUTPATIENT)
Dept: ENDOCRINOLOGY | Facility: HOSPITAL | Age: 48
End: 2018-11-15

## 2018-11-15 DIAGNOSIS — E78.00 HYPERCHOLESTEROLEMIA: ICD-10-CM

## 2018-11-15 DIAGNOSIS — E11.49 OTHER DIABETIC NEUROLOGICAL COMPLICATION ASSOCIATED WITH TYPE 2 DIABETES MELLITUS (HCC): ICD-10-CM

## 2018-11-15 DIAGNOSIS — Z79.4 TYPE 2 DIABETES MELLITUS WITH OTHER SPECIFIED COMPLICATION, WITH LONG-TERM CURRENT USE OF INSULIN (HCC): ICD-10-CM

## 2018-11-15 DIAGNOSIS — I10 HYPERTENSION, UNSPECIFIED TYPE: Primary | ICD-10-CM

## 2018-11-15 DIAGNOSIS — E11.69 TYPE 2 DIABETES MELLITUS WITH OTHER SPECIFIED COMPLICATION, WITH LONG-TERM CURRENT USE OF INSULIN (HCC): ICD-10-CM

## 2018-11-15 RX ORDER — PIOGLITAZONEHYDROCHLORIDE 15 MG/1
15 TABLET ORAL DAILY
Qty: 90 TABLET | Refills: 0 | Status: SHIPPED | OUTPATIENT
Start: 2018-11-15 | End: 2019-03-20 | Stop reason: SDUPTHER

## 2018-11-15 RX ORDER — CALCIUM CITRATE/VITAMIN D3 200MG-6.25
TABLET ORAL
Qty: 600 EACH | Refills: 2 | Status: SHIPPED | OUTPATIENT
Start: 2018-11-15 | End: 2019-03-20 | Stop reason: SDUPTHER

## 2018-11-15 RX ORDER — SIMVASTATIN 40 MG
40 TABLET ORAL
Qty: 90 TABLET | Refills: 0 | Status: SHIPPED | OUTPATIENT
Start: 2018-11-15 | End: 2019-03-20 | Stop reason: SDUPTHER

## 2018-11-15 RX ORDER — LISINOPRIL 10 MG/1
10 TABLET ORAL DAILY
Qty: 90 TABLET | Refills: 1 | Status: SHIPPED | OUTPATIENT
Start: 2018-11-15 | End: 2019-03-20 | Stop reason: SDUPTHER

## 2018-11-15 RX ORDER — INSULIN GLARGINE 100 [IU]/ML
18 INJECTION, SOLUTION SUBCUTANEOUS DAILY
Qty: 5 PEN | Refills: 0 | Status: SHIPPED | OUTPATIENT
Start: 2018-11-15 | End: 2019-03-20 | Stop reason: SDUPTHER

## 2018-11-15 NOTE — TELEPHONE ENCOUNTER
Reviewed Mendez's lab work  It appears that he no showed for his appointments  His fasting glucose is slightly elevated at 118 on his comprehensive metabolic panel  His hemoglobin A1c is 6 9    His urine microalbumin testing is normal

## 2019-02-26 ENCOUNTER — TELEPHONE (OUTPATIENT)
Dept: ENDOCRINOLOGY | Facility: HOSPITAL | Age: 49
End: 2019-02-26

## 2019-02-26 DIAGNOSIS — I10 ESSENTIAL HYPERTENSION: ICD-10-CM

## 2019-02-26 DIAGNOSIS — E11.42 DIABETIC POLYNEUROPATHY ASSOCIATED WITH TYPE 2 DIABETES MELLITUS (HCC): ICD-10-CM

## 2019-02-26 DIAGNOSIS — Z79.4 TYPE 2 DIABETES MELLITUS WITH HYPERGLYCEMIA, WITH LONG-TERM CURRENT USE OF INSULIN (HCC): Primary | ICD-10-CM

## 2019-02-26 DIAGNOSIS — E11.65 TYPE 2 DIABETES MELLITUS WITH HYPERGLYCEMIA, WITH LONG-TERM CURRENT USE OF INSULIN (HCC): Primary | ICD-10-CM

## 2019-02-26 DIAGNOSIS — E78.2 MIXED HYPERLIPIDEMIA: ICD-10-CM

## 2019-02-26 NOTE — TELEPHONE ENCOUNTER
Patient scheduled for 3-20-19  He missed his last three appointments, but it was probably because we did not have his current phone number  Do you want me to mail him the last blood work ordered or do you want to re-order the tests?

## 2019-03-07 DIAGNOSIS — E11.69 TYPE 2 DIABETES MELLITUS WITH OTHER SPECIFIED COMPLICATION, WITH LONG-TERM CURRENT USE OF INSULIN (HCC): ICD-10-CM

## 2019-03-07 DIAGNOSIS — E11.49 OTHER DIABETIC NEUROLOGICAL COMPLICATION ASSOCIATED WITH TYPE 2 DIABETES MELLITUS (HCC): ICD-10-CM

## 2019-03-07 DIAGNOSIS — Z79.4 TYPE 2 DIABETES MELLITUS WITH HYPERGLYCEMIA, WITH LONG-TERM CURRENT USE OF INSULIN (HCC): Primary | ICD-10-CM

## 2019-03-07 DIAGNOSIS — E11.65 TYPE 2 DIABETES MELLITUS WITH HYPERGLYCEMIA, WITH LONG-TERM CURRENT USE OF INSULIN (HCC): Primary | ICD-10-CM

## 2019-03-07 DIAGNOSIS — Z79.4 TYPE 2 DIABETES MELLITUS WITH OTHER SPECIFIED COMPLICATION, WITH LONG-TERM CURRENT USE OF INSULIN (HCC): ICD-10-CM

## 2019-03-08 LAB
ALBUMIN SERPL-MCNC: 4.5 G/DL (ref 3.6–5.1)
ALBUMIN/GLOB SERPL: 2 (CALC) (ref 1–2.5)
ALP SERPL-CCNC: 83 U/L (ref 40–115)
ALT SERPL-CCNC: 13 U/L (ref 9–46)
AST SERPL-CCNC: 13 U/L (ref 10–40)
BILIRUB SERPL-MCNC: 0.5 MG/DL (ref 0.2–1.2)
BUN SERPL-MCNC: 17 MG/DL (ref 7–25)
BUN/CREAT SERPL: ABNORMAL (CALC) (ref 6–22)
CALCIUM SERPL-MCNC: 9.8 MG/DL (ref 8.6–10.3)
CHLORIDE SERPL-SCNC: 104 MMOL/L (ref 98–110)
CHOLEST SERPL-MCNC: 142 MG/DL
CHOLEST/HDLC SERPL: 3 (CALC)
CO2 SERPL-SCNC: 29 MMOL/L (ref 20–32)
CREAT SERPL-MCNC: 0.91 MG/DL (ref 0.6–1.35)
EST. AVERAGE GLUCOSE BLD GHB EST-MCNC: 169 (CALC)
EST. AVERAGE GLUCOSE BLD GHB EST-SCNC: 9.3 (CALC)
GLOBULIN SER CALC-MCNC: 2.2 G/DL (CALC) (ref 1.9–3.7)
GLUCOSE SERPL-MCNC: 107 MG/DL (ref 65–99)
HBA1C MFR BLD: 7.5 % OF TOTAL HGB
HDLC SERPL-MCNC: 47 MG/DL
LDLC SERPL CALC-MCNC: 80 MG/DL (CALC)
NONHDLC SERPL-MCNC: 95 MG/DL (CALC)
POTASSIUM SERPL-SCNC: 4.6 MMOL/L (ref 3.5–5.3)
PROT SERPL-MCNC: 6.7 G/DL (ref 6.1–8.1)
SL AMB EGFR AFRICAN AMERICAN: 116 ML/MIN/1.73M2
SL AMB EGFR NON AFRICAN AMERICAN: 100 ML/MIN/1.73M2
SODIUM SERPL-SCNC: 139 MMOL/L (ref 135–146)
TRIGL SERPL-MCNC: 69 MG/DL

## 2019-03-08 RX ORDER — INSULIN LISPRO 100 [IU]/ML
INJECTION, SOLUTION INTRAVENOUS; SUBCUTANEOUS
Qty: 15 ML | Refills: 0 | Status: SHIPPED | OUTPATIENT
Start: 2019-03-08 | End: 2020-03-17 | Stop reason: SDUPTHER

## 2019-03-08 RX ORDER — PEN NEEDLE, DIABETIC 32GX 5/32"
NEEDLE, DISPOSABLE MISCELLANEOUS
Qty: 100 EACH | Refills: 0 | Status: SHIPPED | OUTPATIENT
Start: 2019-03-08 | End: 2020-03-17 | Stop reason: ALTCHOICE

## 2019-03-13 ENCOUNTER — TELEPHONE (OUTPATIENT)
Dept: ENDOCRINOLOGY | Facility: HOSPITAL | Age: 49
End: 2019-03-13

## 2019-03-13 NOTE — TELEPHONE ENCOUNTER
Patient would like to know if he could have a sample of Humalog, the pharmacy will not fill his script until April and he is completely out

## 2019-03-20 ENCOUNTER — OFFICE VISIT (OUTPATIENT)
Dept: ENDOCRINOLOGY | Facility: HOSPITAL | Age: 49
End: 2019-03-20
Payer: MEDICARE

## 2019-03-20 VITALS
DIASTOLIC BLOOD PRESSURE: 68 MMHG | HEIGHT: 73 IN | SYSTOLIC BLOOD PRESSURE: 102 MMHG | WEIGHT: 227.6 LBS | BODY MASS INDEX: 30.17 KG/M2 | HEART RATE: 96 BPM

## 2019-03-20 DIAGNOSIS — E78.2 MIXED HYPERLIPIDEMIA: ICD-10-CM

## 2019-03-20 DIAGNOSIS — Z79.4 TYPE 2 DIABETES MELLITUS WITH HYPERGLYCEMIA, WITH LONG-TERM CURRENT USE OF INSULIN (HCC): Primary | ICD-10-CM

## 2019-03-20 DIAGNOSIS — E78.00 HYPERCHOLESTEROLEMIA: ICD-10-CM

## 2019-03-20 DIAGNOSIS — E11.69 TYPE 2 DIABETES MELLITUS WITH OTHER SPECIFIED COMPLICATION, WITH LONG-TERM CURRENT USE OF INSULIN (HCC): ICD-10-CM

## 2019-03-20 DIAGNOSIS — E11.65 TYPE 2 DIABETES MELLITUS WITH HYPERGLYCEMIA, WITH LONG-TERM CURRENT USE OF INSULIN (HCC): Primary | ICD-10-CM

## 2019-03-20 DIAGNOSIS — I10 HYPERTENSION, UNSPECIFIED TYPE: ICD-10-CM

## 2019-03-20 DIAGNOSIS — Z79.4 TYPE 2 DIABETES MELLITUS WITH OTHER SPECIFIED COMPLICATION, WITH LONG-TERM CURRENT USE OF INSULIN (HCC): ICD-10-CM

## 2019-03-20 DIAGNOSIS — I10 ESSENTIAL HYPERTENSION: ICD-10-CM

## 2019-03-20 DIAGNOSIS — E11.8 TYPE 2 DIABETES MELLITUS WITH COMPLICATION, UNSPECIFIED WHETHER LONG TERM INSULIN USE: ICD-10-CM

## 2019-03-20 DIAGNOSIS — E11.49 OTHER DIABETIC NEUROLOGICAL COMPLICATION ASSOCIATED WITH TYPE 2 DIABETES MELLITUS (HCC): ICD-10-CM

## 2019-03-20 DIAGNOSIS — E11.42 DIABETIC POLYNEUROPATHY ASSOCIATED WITH TYPE 2 DIABETES MELLITUS (HCC): ICD-10-CM

## 2019-03-20 PROCEDURE — 99215 OFFICE O/P EST HI 40 MIN: CPT | Performed by: INTERNAL MEDICINE

## 2019-03-20 RX ORDER — LISINOPRIL 10 MG/1
10 TABLET ORAL DAILY
Qty: 90 TABLET | Refills: 3 | Status: SHIPPED | OUTPATIENT
Start: 2019-03-20 | End: 2019-05-28 | Stop reason: SDUPTHER

## 2019-03-20 RX ORDER — SIMVASTATIN 40 MG
40 TABLET ORAL
Qty: 90 TABLET | Refills: 3 | Status: SHIPPED | OUTPATIENT
Start: 2019-03-20 | End: 2020-01-29

## 2019-03-20 RX ORDER — TEMAZEPAM 30 MG/1
30 CAPSULE ORAL
COMMUNITY
End: 2020-03-17 | Stop reason: ALTCHOICE

## 2019-03-20 RX ORDER — CALCIUM CITRATE/VITAMIN D3 200MG-6.25
TABLET ORAL
Qty: 600 EACH | Refills: 2 | Status: SHIPPED | OUTPATIENT
Start: 2019-03-20 | End: 2020-03-17 | Stop reason: SDUPTHER

## 2019-03-20 RX ORDER — PEN NEEDLE, DIABETIC 31 GX5/16"
NEEDLE, DISPOSABLE MISCELLANEOUS
Qty: 200 EACH | Refills: 6 | Status: SHIPPED | OUTPATIENT
Start: 2019-03-20 | End: 2019-11-05 | Stop reason: SDUPTHER

## 2019-03-20 RX ORDER — PERPHENAZINE 8 MG
8 TABLET ORAL DAILY
COMMUNITY
End: 2020-03-17 | Stop reason: ALTCHOICE

## 2019-03-20 RX ORDER — PIOGLITAZONEHYDROCHLORIDE 15 MG/1
15 TABLET ORAL DAILY
Qty: 90 TABLET | Refills: 3 | Status: SHIPPED | OUTPATIENT
Start: 2019-03-20 | End: 2020-02-05

## 2019-03-20 RX ORDER — INSULIN GLARGINE 100 [IU]/ML
18 INJECTION, SOLUTION SUBCUTANEOUS DAILY
Qty: 10 PEN | Refills: 3 | Status: SHIPPED | OUTPATIENT
Start: 2019-03-20 | End: 2020-03-17 | Stop reason: SDUPTHER

## 2019-03-20 NOTE — PATIENT INSTRUCTIONS
hgba1c is 7 5%  this is better than last summer  I would like it around 7 0%  Cholesterol and live and kidney function are good  No change in insulin, metformin, or pioglitazone  Get back to testing 3-4 times a day  Get eye doctor appointment  Work with the psychiatrists to help you sleep, this may improve the blood sugars  Follow up in 4 months with blood work

## 2019-03-20 NOTE — PROGRESS NOTES
3/20/2019    Assessment/Plan      Diagnoses and all orders for this visit:    Type 2 diabetes mellitus with hyperglycemia, with long-term current use of insulin [facility]  -     HEMOGLOBIN A1C W/ EAG ESTIMATION Lab Collect; Future  -     Comprehensive metabolic panel Lab Collect; Future  -     TSH, 3rd generation Lab Collect; Future    Diabetic polyneuropathy associated with type 2 diabetes mellitus (HCC)  -     HEMOGLOBIN A1C W/ EAG ESTIMATION Lab Collect; Future  -     Comprehensive metabolic panel Lab Collect; Future  -     TSH, 3rd generation Lab Collect; Future    Essential hypertension  -     HEMOGLOBIN A1C W/ EAG ESTIMATION Lab Collect; Future  -     Comprehensive metabolic panel Lab Collect; Future  -     TSH, 3rd generation Lab Collect; Future    Mixed hyperlipidemia  -     HEMOGLOBIN A1C W/ EAG ESTIMATION Lab Collect; Future  -     Comprehensive metabolic panel Lab Collect; Future  -     TSH, 3rd generation Lab Collect; Future    Type 2 diabetes mellitus with other specified complication, with long-term current use of insulin ([facility])  -     insulin lispro (HUMALOG KWIKPEN) 100 units/mL injection pen; Inject 6 Units under the skin 3 (three) times a day with meals Patient must keep appointment for further refills  -     LANTUS SOLOSTAR 100 units/mL injection pen; Inject 18 Units under the skin daily Patient must keep appointment for further refills  -     metFORMIN (GLUCOPHAGE) 500 mg tablet; Take 2 tablets (1,000 mg total) by mouth 2 (two) times a day with meals Patient must keep appointment for further refills  -     pioglitazone (ACTOS) 15 mg tablet; Take 1 tablet (15 mg total) by mouth daily Patient must keep appointment for further refills  -     TRUE METRIX BLOOD GLUCOSE TEST test strip; Use as instructed up to 5 times a day  Patient must keep appointment for further refills      Other diabetic neurological complication associated with type 2 diabetes mellitus (HCC)  -     insulin lispro (HUMALOG KWIKPEN) 100 units/mL injection pen; Inject 6 Units under the skin 3 (three) times a day with meals Patient must keep appointment for further refills  -     LANTUS SOLOSTAR 100 units/mL injection pen; Inject 18 Units under the skin daily Patient must keep appointment for further refills  -     metFORMIN (GLUCOPHAGE) 500 mg tablet; Take 2 tablets (1,000 mg total) by mouth 2 (two) times a day with meals Patient must keep appointment for further refills  -     pioglitazone (ACTOS) 15 mg tablet; Take 1 tablet (15 mg total) by mouth daily Patient must keep appointment for further refills  -     TRUE METRIX BLOOD GLUCOSE TEST test strip; Use as instructed up to 5 times a day  Patient must keep appointment for further refills  Hypercholesterolemia  -     simvastatin (ZOCOR) 40 mg tablet; Take 1 tablet (40 mg total) by mouth daily at bedtime Patient must keep appointment for further refills  Hypertension, unspecified type  -     lisinopril (ZESTRIL) 10 mg tablet; Take 1 tablet (10 mg total) by mouth daily Patient must keep appointment for further refills  Type 2 diabetes mellitus with complication, unspecified whether long term insulin use (HCC)  -     EASY TOUCH PEN NEEDLES 31G X 8 MM MISC; Use as directed (4 times daily)    Other orders  -     perphenazine 8 mg tablet; Take 8 mg by mouth daily  -     temazepam (RESTORIL) 30 mg capsule; Take 30 mg by mouth daily at bedtime        Assessment/Plan:  1  Type 2 diabetes insulin requiring  His most recent hemoglobin A1c is not at goal of 7 5% in is a little bit higher than in the fall but definitely improved from the summer when it was over 8  For now, I will have him continue the same Lantus insulin and Humalog insulin  His increase in hemoglobin A1c may have been due to the steroid injections he was getting in his shoulder  He will continue to check his blood sugars up to 4 times a day  He will continue the same pioglitazone and metformin    He is due for follow-up with the ophthalmologist and I have asked him to make sure that he gets an appointment to an eye doctor  I would like him to follow-up with a psychiatrist as he needs to sleep better which may improve his blood sugars and hopefully they can help him with that  2  Diabetic neuropathy  He has no symptoms  He sees a podiatrist on a regular basis  3  Hypertension  He is on lisinopril 10 mg daily  Blood pressure is under good control  4  Hyperlipidemia  He is currently taking simvastatin 40 mg at bedtime  Last lipid panel was excellent  I have asked him to follow up in 4 months with preceding hemoglobin A1c, CMP, and TSH  CC: Diabetes type 2, lipid, and blood pressure follow-up    History of Present Illness     HPI: Sylvia Thomas is a 53y o  year old male with type 2 diabetes for 24 years  He is on oral agents and insulin at home and takes lantus insulin 18 units at bedtime, Humalog insulin 4-6 units 4 times a day, metformin 500 mg 2 tablets twice a day, and pioglitazone 15 mg daily  He denies any polyuria, polydipsia, nocturia, polyphagia, and blurry vision  He denies chest pain or shortness of breath  He denies numbness or tingling of his feet  He denies nephropathy, retinopathy, heart attack, stroke and claudication but does admit to neuropathy  Bilateral steroid injections in shoulder, last one last week  He has hypertension and takes lisinopril 10 mg daily  He denies headache or stroke-like symptoms  He has hyperlipidemia and takes simvastatin 40 mg daily  He denies chest pain, shortness of breath, or myalgias  Hypoglycemic episodes: No rare  The patient's last eye exam was in march 2018 with no retinopathy  The patient's last foot exam was in June 2018 at podiatrist  Last A1C was   Lab Results   Component Value Date    HGBA1C 7 5 (H) 03/07/2019     Blood Sugar/Glucometer/Pump/CGM review: checks blood sugars once a day for 2 weeks and was up to 4 times a day   No record brought with him today  usually 140s all day  Review of Systems   Constitutional: Positive for fatigue and unexpected weight change  22 lb more than last visit in June 2018  HENT: Negative for trouble swallowing  Eyes: Negative for visual disturbance  Wears glasses  Respiratory: Positive for wheezing  Negative for chest tightness and shortness of breath  Some wheezing  Cardiovascular: Negative for chest pain and palpitations  Gastrointestinal: Negative for abdominal pain, constipation, diarrhea and nausea  Endocrine: Negative for polydipsia, polyphagia and polyuria  No Nocturia  Musculoskeletal: Positive for arthralgias  Negative for myalgias  Had recent injections of steroids for bilateral frozen shoulders  Skin: Negative for wound  Neurological: Negative for weakness, numbness and headaches  Psychiatric/Behavioral: Positive for sleep disturbance  Only sleeps 2-3 hours a night still despite med changes         Historical Information   Past Medical History:   Diagnosis Date    Depression     Diabetic frozen shoulder associated with type 2 diabetes mellitus (HCC)     bilaterally    [facility]      Past Surgical History:   Procedure Laterality Date    TOOTH EXTRACTION       Social History   Social History     Substance and Sexual Activity   Alcohol Use No     Social History     Substance and Sexual Activity   Drug Use No     Social History     Tobacco Use   Smoking Status Current Every Day Smoker    Packs/day: 0 50    Types: Cigarettes   Smokeless Tobacco Never Used     Family History:   Family History   Problem Relation Age of Onset    Breast cancer Mother     Diabetes unspecified Father     Hypertension Father     No Known Problems Brother     Diabetes unspecified Paternal Aunt     No Known Problems Brother        Meds/Allergies   Current Outpatient Medications   Medication Sig Dispense Refill    citalopram (CeleXA) 20 mg tablet Take 20 mg by mouth daily        clonazePAM (KlonoPIN) 0 5 mg tablet 0 5 mg 2 (two) times a day        EASY TOUCH PEN NEEDLES 31G X 8 MM MISC Use as directed (4 times daily) 200 each 6    HUMALOG KWIKPEN 100 units/mL injection pen INJECT 4-6 UNITS WITH EACH MEAL SUBCUTANEOUSLY AS DIRECTED -KEEP INREFRIGERATOR 15 mL 0    insulin lispro (HUMALOG KWIKPEN) 100 units/mL injection pen Inject 6 Units under the skin 3 (three) times a day with meals Patient must keep appointment for further refills  10 pen 3    LANTUS SOLOSTAR 100 units/mL injection pen Inject 18 Units under the skin daily Patient must keep appointment for further refills  10 pen 3    lisinopril (ZESTRIL) 10 mg tablet Take 1 tablet (10 mg total) by mouth daily Patient must keep appointment for further refills  90 tablet 3    metFORMIN (GLUCOPHAGE) 500 mg tablet Take 2 tablets (1,000 mg total) by mouth 2 (two) times a day with meals Patient must keep appointment for further refills  360 tablet 3    perphenazine 8 mg tablet Take 8 mg by mouth daily      pioglitazone (ACTOS) 15 mg tablet Take 1 tablet (15 mg total) by mouth daily Patient must keep appointment for further refills  90 tablet 3    PRO COMFORT PEN NEEDLES 32G X 6 MM MISC USE FOUR TIMES DAILY 100 each 0    simvastatin (ZOCOR) 40 mg tablet Take 1 tablet (40 mg total) by mouth daily at bedtime Patient must keep appointment for further refills  90 tablet 3    temazepam (RESTORIL) 30 mg capsule Take 30 mg by mouth daily at bedtime      TRUE METRIX BLOOD GLUCOSE TEST test strip Use as instructed up to 5 times a day  Patient must keep appointment for further refills  600 each 2     No current facility-administered medications for this visit  No Known Allergies    Objective   Vitals: Blood pressure 102/68, pulse 96, height 6' 1" (1 854 m), weight 103 kg (227 lb 9 6 oz)  Invasive Devices          None          Physical Exam   Constitutional: He is oriented to person, place, and time   He appears well-developed and well-nourished  HENT:   Head: Normocephalic and atraumatic  Eyes: Pupils are equal, round, and reactive to light  Conjunctivae and EOM are normal    Neck: Normal range of motion  Neck supple  No thyromegaly present  No carotid bruits  Cardiovascular: Normal rate, regular rhythm, normal heart sounds and intact distal pulses  Pulses are no weak pulses  No murmur heard  Pulses:       Dorsalis pedis pulses are 2+ on the right side, and 2+ on the left side  Posterior tibial pulses are 2+ on the right side, and 2+ on the left side  Pulmonary/Chest: Effort normal and breath sounds normal  He has no wheezes  Abdominal: Soft  There is no tenderness  Musculoskeletal: Normal range of motion  He exhibits no edema or deformity  thick callus medial 1st toe bilaterally  Slight callus medial 1st MP joint bilaterally  No ulceration of the feet  Feet:   Right Foot:   Skin Integrity: Positive for callus  Negative for ulcer, skin breakdown, erythema, warmth or dry skin  Left Foot:   Skin Integrity: Positive for callus  Negative for ulcer, skin breakdown, erythema, warmth or dry skin  Lymphadenopathy:     He has no cervical adenopathy  Neurological: He is alert and oriented to person, place, and time  He has normal reflexes  Vibration sensation diminished the 1st toe DIP joint bilaterally  Microfilament sensation intact bilaterally  Skin: Skin is warm and dry  No rash noted  No erythema  Vitals reviewed  Patient's shoes and socks removed  Right Foot/Ankle   Right Foot Inspection  Skin Exam: skin normal, skin intact, callus and callus no dry skin, no warmth, no erythema, no maceration, no abnormal color, no pre-ulcer and no ulcer                          Toe Exam: ROM and strength within normal limits  Sensory   Vibration: diminished    Monofilament testing: intact  Vascular  Capillary refills: < 3 seconds  The right DP pulse is 2+  The right PT pulse is 2+       Left Foot/Ankle  Left Foot Inspection  Skin Exam: skin normal, skin intact and callusno dry skin, no warmth, no erythema, no maceration, normal color, no pre-ulcer and no ulcer                         Toe Exam: ROM and strength within normal limits                   Sensory   Vibration: diminished    Monofilament: intact  Vascular  Capillary refills: < 3 seconds  The left DP pulse is 2+  The left PT pulse is 2+  Assign Risk Category:  Deformity present; No loss of protective sensation; No weak pulses       Risk: 1        The history was obtained from the review of the chart and from the patient  Lab Results:    Most recent Alc is  Lab Results   Component Value Date    HGBA1C 7 5 (H) 03/07/2019           CMP done on 03/07/2019 showed a glucose of 107 fasting but was otherwise normal     Lab Results   Component Value Date    BUN 17 03/07/2019    K 4 6 03/07/2019     03/07/2019    CO2 29 03/07/2019     Total cholesterol 142, LDL cholesterol 80     Lab Results   Component Value Date    HDL 47 03/07/2019    TRIG 69 03/07/2019       Lab Results   Component Value Date    ALT 13 03/07/2019    AST 13 03/07/2019    ALKPHOS 83 03/07/2019       Future Appointments   Date Time Provider Petra Cutler   7/23/2019  3:45 PM Danielle Quiñonez, [address]  [place]

## 2019-05-28 DIAGNOSIS — I10 HYPERTENSION, UNSPECIFIED TYPE: ICD-10-CM

## 2019-05-28 RX ORDER — LISINOPRIL 10 MG/1
TABLET ORAL
Qty: 90 TABLET | Refills: 3 | Status: SHIPPED | OUTPATIENT
Start: 2019-05-28 | End: 2020-03-17 | Stop reason: SDUPTHER

## 2019-07-24 LAB
ALBUMIN SERPL-MCNC: 4.4 G/DL (ref 3.6–5.1)
ALBUMIN/GLOB SERPL: 2 (CALC) (ref 1–2.5)
ALP SERPL-CCNC: 74 U/L (ref 40–115)
ALT SERPL-CCNC: 13 U/L (ref 9–46)
AST SERPL-CCNC: 12 U/L (ref 10–40)
BILIRUB SERPL-MCNC: 0.4 MG/DL (ref 0.2–1.2)
BUN SERPL-MCNC: 26 MG/DL (ref 7–25)
BUN/CREAT SERPL: 30 (CALC) (ref 6–22)
CALCIUM SERPL-MCNC: 9.5 MG/DL (ref 8.6–10.3)
CHLORIDE SERPL-SCNC: 105 MMOL/L (ref 98–110)
CO2 SERPL-SCNC: 28 MMOL/L (ref 20–32)
CREAT SERPL-MCNC: 0.87 MG/DL (ref 0.6–1.35)
EST. AVERAGE GLUCOSE BLD GHB EST-MCNC: 166 (CALC)
EST. AVERAGE GLUCOSE BLD GHB EST-SCNC: 9.2 (CALC)
GLOBULIN SER CALC-MCNC: 2.2 G/DL (CALC) (ref 1.9–3.7)
GLUCOSE SERPL-MCNC: 136 MG/DL (ref 65–99)
HBA1C MFR BLD: 7.4 % OF TOTAL HGB
POTASSIUM SERPL-SCNC: 4.4 MMOL/L (ref 3.5–5.3)
PROT SERPL-MCNC: 6.6 G/DL (ref 6.1–8.1)
SL AMB EGFR AFRICAN AMERICAN: 118 ML/MIN/1.73M2
SL AMB EGFR NON AFRICAN AMERICAN: 102 ML/MIN/1.73M2
SODIUM SERPL-SCNC: 138 MMOL/L (ref 135–146)
TSH SERPL-ACNC: 2.42 MIU/L (ref 0.4–4.5)

## 2019-11-05 DIAGNOSIS — E11.8 TYPE 2 DIABETES MELLITUS WITH COMPLICATION (HCC): ICD-10-CM

## 2020-01-03 LAB
LEFT EYE DIABETIC RETINOPATHY: NORMAL
RIGHT EYE DIABETIC RETINOPATHY: NORMAL

## 2020-01-16 ENCOUNTER — TELEPHONE (OUTPATIENT)
Dept: ENDOCRINOLOGY | Facility: HOSPITAL | Age: 50
End: 2020-01-16

## 2020-01-16 DIAGNOSIS — E78.5 HYPERLIPIDEMIA, UNSPECIFIED HYPERLIPIDEMIA TYPE: ICD-10-CM

## 2020-01-16 DIAGNOSIS — E11.8 TYPE 2 DIABETES MELLITUS WITH COMPLICATION (HCC): Primary | ICD-10-CM

## 2020-01-16 NOTE — TELEPHONE ENCOUNTER
Patient scheduled for 3-17-20  Was last seen 3-20-19  Last blood work was done 7-23-19  What blood work do you want done prior to the March appointment? Mail lab slip & appt card to patient

## 2020-01-29 DIAGNOSIS — E78.00 HYPERCHOLESTEROLEMIA: ICD-10-CM

## 2020-01-29 RX ORDER — SIMVASTATIN 40 MG
TABLET ORAL
Qty: 90 TABLET | Refills: 0 | Status: SHIPPED | OUTPATIENT
Start: 2020-01-29 | End: 2020-03-17 | Stop reason: SDUPTHER

## 2020-02-05 DIAGNOSIS — E11.49 OTHER DIABETIC NEUROLOGICAL COMPLICATION ASSOCIATED WITH TYPE 2 DIABETES MELLITUS (HCC): ICD-10-CM

## 2020-02-05 DIAGNOSIS — Z79.4 TYPE 2 DIABETES MELLITUS WITH OTHER SPECIFIED COMPLICATION, WITH LONG-TERM CURRENT USE OF INSULIN (HCC): ICD-10-CM

## 2020-02-05 DIAGNOSIS — E11.69 TYPE 2 DIABETES MELLITUS WITH OTHER SPECIFIED COMPLICATION, WITH LONG-TERM CURRENT USE OF INSULIN (HCC): ICD-10-CM

## 2020-02-05 RX ORDER — PIOGLITAZONEHYDROCHLORIDE 15 MG/1
TABLET ORAL
Qty: 90 TABLET | Refills: 0 | Status: SHIPPED | OUTPATIENT
Start: 2020-02-05 | End: 2020-03-17 | Stop reason: ALTCHOICE

## 2020-02-07 NOTE — TELEPHONE ENCOUNTER
Patient requests refills, has Feb appt  Canceled today because of weather  PAST SURGICAL HISTORY:  S/P cholecystectomy

## 2020-02-17 DIAGNOSIS — E11.69 TYPE 2 DIABETES MELLITUS WITH OTHER SPECIFIED COMPLICATION, WITH LONG-TERM CURRENT USE OF INSULIN (HCC): ICD-10-CM

## 2020-02-17 DIAGNOSIS — Z79.4 TYPE 2 DIABETES MELLITUS WITH OTHER SPECIFIED COMPLICATION, WITH LONG-TERM CURRENT USE OF INSULIN (HCC): ICD-10-CM

## 2020-02-17 DIAGNOSIS — E11.49 OTHER DIABETIC NEUROLOGICAL COMPLICATION ASSOCIATED WITH TYPE 2 DIABETES MELLITUS (HCC): ICD-10-CM

## 2020-03-13 LAB
ALBUMIN SERPL-MCNC: 4.4 G/DL (ref 3.6–5.1)
ALBUMIN/CREAT UR: 6 MCG/MG CREAT
ALBUMIN/GLOB SERPL: 2.2 (CALC) (ref 1–2.5)
ALP SERPL-CCNC: 82 U/L (ref 36–130)
ALT SERPL-CCNC: 13 U/L (ref 9–46)
AST SERPL-CCNC: 14 U/L (ref 10–40)
BASOPHILS # BLD AUTO: 0 CELLS/UL (ref 0–200)
BASOPHILS NFR BLD AUTO: 0 %
BILIRUB SERPL-MCNC: 0.5 MG/DL (ref 0.2–1.2)
BUN SERPL-MCNC: 20 MG/DL (ref 7–25)
BUN/CREAT SERPL: ABNORMAL (CALC) (ref 6–22)
CALCIUM SERPL-MCNC: 9.5 MG/DL (ref 8.6–10.3)
CHLORIDE SERPL-SCNC: 107 MMOL/L (ref 98–110)
CHOLEST SERPL-MCNC: 131 MG/DL
CHOLEST/HDLC SERPL: 3.7 (CALC)
CO2 SERPL-SCNC: 26 MMOL/L (ref 20–32)
CREAT SERPL-MCNC: 1.01 MG/DL (ref 0.6–1.35)
CREAT UR-MCNC: 163 MG/DL (ref 20–320)
EOSINOPHIL # BLD AUTO: 0 CELLS/UL (ref 15–500)
EOSINOPHIL NFR BLD AUTO: 0 %
ERYTHROCYTE [DISTWIDTH] IN BLOOD BY AUTOMATED COUNT: 13.1 % (ref 11–15)
GLOBULIN SER CALC-MCNC: 2 G/DL (CALC) (ref 1.9–3.7)
GLUCOSE SERPL-MCNC: 115 MG/DL (ref 65–99)
HBA1C MFR BLD: 8.8 % OF TOTAL HGB
HCT VFR BLD AUTO: 43.8 % (ref 38.5–50)
HDLC SERPL-MCNC: 35 MG/DL
HGB BLD-MCNC: 14.8 G/DL (ref 13.2–17.1)
LDLC SERPL CALC-MCNC: 77 MG/DL (CALC)
LYMPHOCYTES # BLD MANUAL: 2472 CELLS/UL (ref 850–3900)
LYMPHOCYTES NFR BLD AUTO: 12 %
MCH RBC QN AUTO: 32.3 PG (ref 27–33)
MCHC RBC AUTO-ENTMCNC: 33.8 G/DL (ref 32–36)
MCV RBC AUTO: 95.6 FL (ref 80–100)
MICROALBUMIN UR-MCNC: 0.9 MG/DL
MONOCYTES # BLD AUTO: 824 CELLS/UL (ref 200–950)
MONOCYTES NFR BLD AUTO: 4 %
NEUTROPHILS # BLD AUTO: ABNORMAL CELLS/UL (ref 1500–7800)
NEUTROPHILS NFR BLD AUTO: 83 %
NEUTS BAND # BLD: 206 CELLS/UL (ref 0–750)
NEUTS BAND NFR BLD MANUAL: 1 %
NONHDLC SERPL-MCNC: 96 MG/DL (CALC)
PLATELET # BLD AUTO: 248 THOUSAND/UL (ref 140–400)
PMV BLD REES-ECKER: 10.1 FL (ref 7.5–12.5)
POTASSIUM SERPL-SCNC: 4.4 MMOL/L (ref 3.5–5.3)
PROT SERPL-MCNC: 6.4 G/DL (ref 6.1–8.1)
RBC # BLD AUTO: 4.58 MILLION/UL (ref 4.2–5.8)
SL AMB EGFR AFRICAN AMERICAN: 101 ML/MIN/1.73M2
SL AMB EGFR NON AFRICAN AMERICAN: 88 ML/MIN/1.73M2
SODIUM SERPL-SCNC: 141 MMOL/L (ref 135–146)
TRIGL SERPL-MCNC: 102 MG/DL
WBC # BLD AUTO: 20.6 THOUSAND/UL (ref 3.8–10.8)

## 2020-03-17 ENCOUNTER — OFFICE VISIT (OUTPATIENT)
Dept: ENDOCRINOLOGY | Facility: HOSPITAL | Age: 50
End: 2020-03-17
Payer: MEDICARE

## 2020-03-17 VITALS
BODY MASS INDEX: 31.6 KG/M2 | WEIGHT: 238.4 LBS | HEIGHT: 73 IN | DIASTOLIC BLOOD PRESSURE: 78 MMHG | SYSTOLIC BLOOD PRESSURE: 104 MMHG | HEART RATE: 86 BPM

## 2020-03-17 DIAGNOSIS — E11.42 DIABETIC POLYNEUROPATHY ASSOCIATED WITH TYPE 2 DIABETES MELLITUS (HCC): ICD-10-CM

## 2020-03-17 DIAGNOSIS — E78.00 HYPERCHOLESTEROLEMIA: ICD-10-CM

## 2020-03-17 DIAGNOSIS — I10 ESSENTIAL HYPERTENSION: ICD-10-CM

## 2020-03-17 DIAGNOSIS — E78.2 MIXED HYPERLIPIDEMIA: ICD-10-CM

## 2020-03-17 DIAGNOSIS — Z79.4 TYPE 2 DIABETES MELLITUS WITH HYPERGLYCEMIA, WITH LONG-TERM CURRENT USE OF INSULIN (HCC): Primary | ICD-10-CM

## 2020-03-17 DIAGNOSIS — E11.65 TYPE 2 DIABETES MELLITUS WITH HYPERGLYCEMIA, WITH LONG-TERM CURRENT USE OF INSULIN (HCC): Primary | ICD-10-CM

## 2020-03-17 DIAGNOSIS — I10 HYPERTENSION, UNSPECIFIED TYPE: ICD-10-CM

## 2020-03-17 DIAGNOSIS — E11.69 TYPE 2 DIABETES MELLITUS WITH OTHER SPECIFIED COMPLICATION, WITH LONG-TERM CURRENT USE OF INSULIN (HCC): ICD-10-CM

## 2020-03-17 DIAGNOSIS — E11.49 OTHER DIABETIC NEUROLOGICAL COMPLICATION ASSOCIATED WITH TYPE 2 DIABETES MELLITUS (HCC): ICD-10-CM

## 2020-03-17 DIAGNOSIS — Z79.4 TYPE 2 DIABETES MELLITUS WITH OTHER SPECIFIED COMPLICATION, WITH LONG-TERM CURRENT USE OF INSULIN (HCC): ICD-10-CM

## 2020-03-17 PROCEDURE — 99215 OFFICE O/P EST HI 40 MIN: CPT | Performed by: INTERNAL MEDICINE

## 2020-03-17 RX ORDER — INSULIN LISPRO 100 [IU]/ML
INJECTION, SOLUTION INTRAVENOUS; SUBCUTANEOUS
Qty: 5 PEN | Refills: 6 | Status: SHIPPED | OUTPATIENT
Start: 2020-03-17 | End: 2020-06-23 | Stop reason: SDUPTHER

## 2020-03-17 RX ORDER — INSULIN GLARGINE 100 [IU]/ML
18 INJECTION, SOLUTION SUBCUTANEOUS DAILY
Qty: 10 PEN | Refills: 3 | Status: SHIPPED | OUTPATIENT
Start: 2020-03-17 | End: 2020-06-23 | Stop reason: SDUPTHER

## 2020-03-17 RX ORDER — SIMVASTATIN 40 MG
40 TABLET ORAL
Qty: 90 TABLET | Refills: 3 | Status: SHIPPED | OUTPATIENT
Start: 2020-03-17 | End: 2021-01-14 | Stop reason: SDUPTHER

## 2020-03-17 RX ORDER — PIOGLITAZONEHYDROCHLORIDE 15 MG/1
15 TABLET ORAL DAILY
Qty: 90 TABLET | Refills: 3 | Status: SHIPPED | OUTPATIENT
Start: 2020-03-17 | End: 2020-06-23 | Stop reason: SDUPTHER

## 2020-03-17 RX ORDER — LISINOPRIL 10 MG/1
10 TABLET ORAL DAILY
Qty: 90 TABLET | Refills: 3 | Status: SHIPPED | OUTPATIENT
Start: 2020-03-17 | End: 2021-01-14 | Stop reason: SDUPTHER

## 2020-03-17 RX ORDER — CALCIUM CITRATE/VITAMIN D3 200MG-6.25
TABLET ORAL
Qty: 600 EACH | Refills: 2 | Status: SHIPPED | OUTPATIENT
Start: 2020-03-17 | End: 2021-01-14 | Stop reason: SDUPTHER

## 2020-03-17 NOTE — PROGRESS NOTES
3/17/2020    Assessment/Plan      Diagnoses and all orders for this visit:    Type 2 diabetes mellitus with hyperglycemia, with long-term current use of insulin ([place] [id number] )  -     HEMOGLOBIN A1C W/ EAG ESTIMATION Lab Collect; Future  -     Comprehensive metabolic panel Lab Collect; Future  -     TSH, 3rd generation Lab Collect; Future  -     insulin lispro (HumaLOG KwikPen) 100 units/mL injection pen; 8 units with breakfast and 4 units with lunch and supper and extra insulin if sugar over 200  -     Insulin Pen Needle (Pro Comfort Pen Needles) 32G X 6 MM MISC; Use to give insulin 4 times a day  -     pioglitazone (ACTOS) 15 mg tablet; Take 1 tablet (15 mg total) by mouth daily    Diabetic polyneuropathy associated with type 2 diabetes mellitus ([place] [address] )  -     HEMOGLOBIN A1C W/ EAG ESTIMATION Lab Collect; Future  -     Comprehensive metabolic panel Lab Collect; Future  -     TSH, 3rd generation Lab Collect; Future  -     insulin lispro (HumaLOG KwikPen) 100 units/mL injection pen; 8 units with breakfast and 4 units with lunch and supper and extra insulin if sugar over 200  -     pioglitazone (ACTOS) 15 mg tablet; Take 1 tablet (15 mg total) by mouth daily    Essential hypertension  -     HEMOGLOBIN A1C W/ EAG ESTIMATION Lab Collect; Future  -     Comprehensive metabolic panel Lab Collect; Future  -     TSH, 3rd generation Lab Collect; Future    Mixed hyperlipidemia  -     HEMOGLOBIN A1C W/ EAG ESTIMATION Lab Collect; Future  -     Comprehensive metabolic panel Lab Collect; Future  -     TSH, 3rd generation Lab Collect; Future    Type 2 diabetes mellitus with other specified complication, with long-term current use of insulin (HCC)  -     LANTUS SOLOSTAR 100 units/mL injection pen; Inject 18 Units under the skin daily  -     metFORMIN (GLUCOPHAGE) 500 mg tablet;  Take 2 tablets (1,000 mg total) by mouth 2 (two) times a day before meals  -     TRUE METRIX BLOOD GLUCOSE TEST test strip; Use as instructed up to 5 times a day     Other diabetic neurological complication associated with type 2 diabetes mellitus (HCC)  -     LANTUS SOLOSTAR 100 units/mL injection pen; Inject 18 Units under the skin daily  -     metFORMIN (GLUCOPHAGE) 500 mg tablet; Take 2 tablets (1,000 mg total) by mouth 2 (two) times a day before meals  -     TRUE METRIX BLOOD GLUCOSE TEST test strip; Use as instructed up to 5 times a day  Hypertension, unspecified type  -     lisinopril (ZESTRIL) 10 mg tablet; Take 1 tablet (10 mg total) by mouth daily    Hypercholesterolemia  -     simvastatin (ZOCOR) 40 mg tablet; Take 1 tablet (40 mg total) by mouth daily at bedtime    Other orders  -     Suvorexant (Belsomra) 10 MG TABS; Take by mouth daily at bedtime        Assessment/Plan:  1  Type 2 diabetes, insulin requiring  His most recent hemoglobin A1c is 8 8%  This has gone up since he was last seen  He now has uncontrolled diabetes  He has not been seen for about 1 year due to some conflicts in scheduling  At this point, I have asked him to increase his Humalog insulin to 8 units at breakfast, 4 units at lunch, and 4 units at supper even if he does not test his blood sugars and then when he does test his blood sugars, to add extra insulin for blood sugars over 200  He will continue the same pioglitazone and metformin along with Lantus insulin 18 units daily  He will continue to test his blood sugars but I have asked him to try to test 3 times a day and send that blood sugar record to our office in several weeks for review  2  Diabetic neuropathy  He denies neuropathic symptoms  Diabetic foot exam was performed in the office today  He does see a podiatrist on a regular basis  3  Hypertension  Blood pressure is under good control on his current dose of lisinopril 10 mg daily  4  Hyperlipidemia  Most recent lipid profile is excellent  He will continue the same simvastatin 40 mg daily      I have asked him to follow up in 3 months with preceding hemoglobin A1c, CMP, and TSH  CC: Diabetes type 2, lipid, blood pressure follow-up    History of Present Illness     HPI: Harvey Hatch is a 51y o  year old male with type 2 diabetes with neuropathy for At least 25 years, hyperlipidemia, hypertension here for follow-up  He is on oral agents and insulin at home and takes pioglitazone 15 mg daily, metformin 500 mg 2 tablets twice a day,Lantus insulin 18 units daily at bedtime, and Humalog insulin 8 units in am and then 4 units if over 200 at lunch and supper  He denies any polyuria, polydipsia, polyphagia, nocturia and blurry vision  He denies numbness or tingling of the feet  He denies chest pain or shortness of breath  He denies nephropathy, retinopathy, heart attack, stroke and claudication but does admit to neuropathy  He was last seen in March of 2019 and has been lost to follow-up  Hypoglycemic episodes: No rare  The patient's last eye exam was in January 2020 with no retinopathy  The patient's last foot exam was in 2018 at endocrine office visit  He does see Podiatry with the last visit around jan 2020  Last A1C was   Lab Results   Component Value Date    HGBA1C 8 8 (H) 03/12/2020     Blood Sugar/Glucometer/Pump/CGM review: checks blood sugars once a day or every other day  Not giving insulin with every meal due to this  150-200 blood sugars per report at bedtime  He has hyperlipidemia and takes simvastatin 40 mg daily  He denies chest pain or shortness of breath  He has hypertension and takes lisinopril 10 mg daily  He denies headache or stroke-like symptoms  Review of Systems   Constitutional: Negative for fatigue and unexpected weight change  HENT: Negative for trouble swallowing  Eyes: Negative for visual disturbance  Wears glasses  Respiratory: Negative for chest tightness and shortness of breath  Cardiovascular: Negative for chest pain and leg swelling     Gastrointestinal: Negative for abdominal pain, constipation, diarrhea and nausea  Endocrine: Negative for polydipsia, polyphagia and polyuria  No nocturia  Skin: Negative for wound  Neurological: Positive for dizziness  Negative for weakness, light-headedness, numbness and headaches  Has some dizziness  Psychiatric/Behavioral: Positive for sleep disturbance  Sleeps about 4 hours a night         Historical Information   Past Medical History:   Diagnosis Date    Depression     Diabetic frozen shoulder associated with type 2 diabetes mellitus (HCC)     bilaterally    Glaucoma     Schizophrenia ([facility] [address] )      Past Surgical History:   Procedure Laterality Date    TOOTH EXTRACTION       Social History   Social History     Substance and Sexual Activity   Alcohol Use No     Social History     Substance and Sexual Activity   Drug Use No     Social History     Tobacco Use   Smoking Status Current Every Day Smoker    Packs/day: 1 00    Types: Cigarettes   Smokeless Tobacco Never Used     Family History:   Family History   Problem Relation Age of Onset    Breast cancer Mother     Diabetes unspecified Father     Hypertension Father     Glaucoma Father     No Known Problems Brother     Diabetes unspecified Paternal Aunt     No Known Problems Brother        Meds/Allergies   Current Outpatient Medications   Medication Sig Dispense Refill    citalopram (CeleXA) 20 mg tablet Take 20 mg by mouth daily        clonazePAM (KlonoPIN) 0 5 mg tablet 0 5 mg 2 (two) times a day        Insulin Pen Needle (Pro Comfort Pen Needles) 32G X 6 MM MISC Use to give insulin 4 times a day 200 each 6    LANTUS SOLOSTAR 100 units/mL injection pen Inject 18 Units under the skin daily 10 pen 3    lisinopril (ZESTRIL) 10 mg tablet Take 1 tablet (10 mg total) by mouth daily 90 tablet 3    metFORMIN (GLUCOPHAGE) 500 mg tablet Take 2 tablets (1,000 mg total) by mouth 2 (two) times a day before meals 360 tablet 3    simvastatin (ZOCOR) 40 mg tablet Take 1 tablet (40 mg total) by mouth daily at bedtime 90 tablet 3    Suvorexant (Belsomra) 10 MG TABS Take by mouth daily at bedtime      TRUE METRIX BLOOD GLUCOSE TEST test strip Use as instructed up to 5 times a day  600 each 2    insulin lispro (HumaLOG KwikPen) 100 units/mL injection pen 8 units with breakfast and 4 units with lunch and supper and extra insulin if sugar over 200 5 pen 6    pioglitazone (ACTOS) 15 mg tablet Take 1 tablet (15 mg total) by mouth daily 90 tablet 3     No current facility-administered medications for this visit  No Known Allergies    Objective   Vitals: Blood pressure 104/78, pulse 86, height 6' 1" (1 854 m), weight 108 kg (238 lb 6 4 oz)  Invasive Devices     None                 Physical Exam   Constitutional: He is oriented to person, place, and time  He appears well-developed and well-nourished  HENT:   Head: Normocephalic and atraumatic  Eyes: Conjunctivae and EOM are normal    Neck: Normal range of motion  Neck supple  No thyromegaly present  Thyroid normal in size  No carotid bruits  Cardiovascular: Normal rate, regular rhythm, normal heart sounds and intact distal pulses  Pulses are no weak pulses  No murmur heard  Pulses:       Dorsalis pedis pulses are 2+ on the right side, and 2+ on the left side  Posterior tibial pulses are 2+ on the right side, and 2+ on the left side  Pulmonary/Chest: Effort normal and breath sounds normal  He has no wheezes  Abdominal: Soft  There is no tenderness  Musculoskeletal: Normal range of motion  He exhibits deformity  He exhibits no edema  Callus medial 1st toe and 1st metatarsophalangeal joint bilaterally  No ulcerations of the feet  Feet:   Right Foot:   Skin Integrity: Positive for callus  Negative for ulcer, skin breakdown, erythema, warmth or dry skin  Left Foot:   Skin Integrity: Positive for callus  Negative for ulcer, skin breakdown, erythema, warmth or dry skin     Lymphadenopathy:     He has no cervical adenopathy  Neurological: He is alert and oriented to person, place, and time  He has normal reflexes  Vibration sensation diminished to the 1st toe DIP joint bilaterally  Microfilament sensation intact bilaterally  Skin: Skin is warm and dry  No rash noted  No erythema  Vitals reviewed  Patient's shoes and socks removed  Right Foot/Ankle   Right Foot Inspection  Skin Exam: skin normal, skin intact, callus and callus no dry skin, no warmth, no erythema, no maceration, no abnormal color, no pre-ulcer and no ulcer                          Toe Exam: ROM and strength within normal limits  Sensory   Vibration: diminished    Monofilament testing: intact  Vascular  Capillary refills: < 3 seconds  The right DP pulse is 2+  The right PT pulse is 2+  Left Foot/Ankle  Left Foot Inspection  Skin Exam: skin normal, skin intact and callusno dry skin, no warmth, no erythema, no maceration, normal color, no pre-ulcer and no ulcer                         Toe Exam: ROM and strength within normal limits                   Sensory   Vibration: diminished    Monofilament: intact  Vascular  Capillary refills: < 3 seconds  The left DP pulse is 2+  The left PT pulse is 2+  Assign Risk Category:  Deformity present; Loss of protective sensation; No weak pulses       Risk: 1        The history was obtained from the review of the chart and from the patient  Lab Results:    Most recent Alc is  Lab Results   Component Value Date    HGBA1C 8 8 (H) 03/12/2020         Blood work done on 03/12/2020 showed a glucose of 115 fasting but was otherwise normal     Lab Results   Component Value Date    CREATININE 1 01 03/12/2020    CREATININE 0 87 07/23/2019    CREATININE 0 91 03/07/2019    BUN 20 03/12/2020    K 4 4 03/12/2020     03/12/2020    CO2 26 03/12/2020     Total cholesterol 131, LDL cholesterol 77     Lab Results   Component Value Date    HDL 35 (L) 03/12/2020    TRIG 102 03/12/2020     Lab Results   Component Value Date ALT 13 03/12/2020    AST 14 03/12/2020    ALKPHOS 82 03/12/2020     Lab Results   Component Value Date    TSH 2 42 07/23/2019     Urine microalbumin to creatinine ratio was 6  CBC is normal except for a WBC count of 20 6      Future Appointments   Date Time Provider Petra Cutler   6/23/2020  1:20 PM Lesley Jarrett MD [facility]

## 2020-03-17 NOTE — PATIENT INSTRUCTIONS
Most recent hemoglobin A1c is 8 8%  This is much higher and demonstrates uncontrolled diabetes  Let's continue the same lantus insulin 187 units at bedtime  Continue the same metformin and pioglitazone  take Humalog insulin with every meal, 8 units at breakfast, and 4 units at lunch and supper every day and then add extra Humalog if sugar is high over 200  Check blood sugars at least 3 times day  ,   the kidney, liver function and cholesterol are good  Continue the same lisinopril and simvastatin  Follow up in 3 months with blood work

## 2020-06-23 ENCOUNTER — OFFICE VISIT (OUTPATIENT)
Dept: ENDOCRINOLOGY | Facility: HOSPITAL | Age: 50
End: 2020-06-23
Payer: COMMERCIAL

## 2020-06-23 VITALS
SYSTOLIC BLOOD PRESSURE: 128 MMHG | HEIGHT: 73 IN | BODY MASS INDEX: 32.44 KG/M2 | DIASTOLIC BLOOD PRESSURE: 78 MMHG | TEMPERATURE: 98.4 F | WEIGHT: 244.8 LBS | HEART RATE: 86 BPM

## 2020-06-23 DIAGNOSIS — Z79.4 TYPE 2 DIABETES MELLITUS WITH OTHER SPECIFIED COMPLICATION, WITH LONG-TERM CURRENT USE OF INSULIN (HCC): ICD-10-CM

## 2020-06-23 DIAGNOSIS — E78.2 MIXED HYPERLIPIDEMIA: ICD-10-CM

## 2020-06-23 DIAGNOSIS — E11.65 TYPE 2 DIABETES MELLITUS WITH HYPERGLYCEMIA, WITH LONG-TERM CURRENT USE OF INSULIN (HCC): Primary | ICD-10-CM

## 2020-06-23 DIAGNOSIS — E11.69 TYPE 2 DIABETES MELLITUS WITH OTHER SPECIFIED COMPLICATION, WITH LONG-TERM CURRENT USE OF INSULIN (HCC): ICD-10-CM

## 2020-06-23 DIAGNOSIS — E11.42 DIABETIC POLYNEUROPATHY ASSOCIATED WITH TYPE 2 DIABETES MELLITUS (HCC): ICD-10-CM

## 2020-06-23 DIAGNOSIS — Z79.4 TYPE 2 DIABETES MELLITUS WITH HYPERGLYCEMIA, WITH LONG-TERM CURRENT USE OF INSULIN (HCC): Primary | ICD-10-CM

## 2020-06-23 DIAGNOSIS — I10 ESSENTIAL HYPERTENSION: ICD-10-CM

## 2020-06-23 DIAGNOSIS — E11.49 OTHER DIABETIC NEUROLOGICAL COMPLICATION ASSOCIATED WITH TYPE 2 DIABETES MELLITUS (HCC): ICD-10-CM

## 2020-06-23 PROCEDURE — 99215 OFFICE O/P EST HI 40 MIN: CPT | Performed by: INTERNAL MEDICINE

## 2020-06-23 RX ORDER — INSULIN GLARGINE 100 [IU]/ML
18 INJECTION, SOLUTION SUBCUTANEOUS DAILY
Qty: 10 PEN | Refills: 3 | Status: SHIPPED | OUTPATIENT
Start: 2020-06-23 | End: 2021-01-14 | Stop reason: SDUPTHER

## 2020-06-23 RX ORDER — INSULIN LISPRO 100 [IU]/ML
INJECTION, SOLUTION INTRAVENOUS; SUBCUTANEOUS
Qty: 5 PEN | Refills: 6 | Status: SHIPPED | OUTPATIENT
Start: 2020-06-23 | End: 2021-01-14 | Stop reason: SDUPTHER

## 2020-06-23 RX ORDER — PIOGLITAZONEHYDROCHLORIDE 15 MG/1
15 TABLET ORAL DAILY
Qty: 90 TABLET | Refills: 3 | Status: SHIPPED | OUTPATIENT
Start: 2020-06-23 | End: 2020-09-24 | Stop reason: ALTCHOICE

## 2020-06-25 LAB
ALBUMIN SERPL-MCNC: 4.2 G/DL (ref 3.6–5.1)
ALBUMIN/GLOB SERPL: 2 (CALC) (ref 1–2.5)
ALP SERPL-CCNC: 75 U/L (ref 36–130)
ALT SERPL-CCNC: 11 U/L (ref 9–46)
AST SERPL-CCNC: 10 U/L (ref 10–40)
BILIRUB SERPL-MCNC: 0.4 MG/DL (ref 0.2–1.2)
BUN SERPL-MCNC: 25 MG/DL (ref 7–25)
BUN/CREAT SERPL: ABNORMAL (CALC) (ref 6–22)
CALCIUM SERPL-MCNC: 9.6 MG/DL (ref 8.6–10.3)
CHLORIDE SERPL-SCNC: 104 MMOL/L (ref 98–110)
CO2 SERPL-SCNC: 31 MMOL/L (ref 20–32)
CREAT SERPL-MCNC: 0.92 MG/DL (ref 0.6–1.35)
EST. AVERAGE GLUCOSE BLD GHB EST-MCNC: 206 (CALC)
EST. AVERAGE GLUCOSE BLD GHB EST-SCNC: 11.4 (CALC)
GLOBULIN SER CALC-MCNC: 2.1 G/DL (CALC) (ref 1.9–3.7)
GLUCOSE SERPL-MCNC: 113 MG/DL (ref 65–99)
HBA1C MFR BLD: 8.8 % OF TOTAL HGB
POTASSIUM SERPL-SCNC: 4.3 MMOL/L (ref 3.5–5.3)
PROT SERPL-MCNC: 6.3 G/DL (ref 6.1–8.1)
SL AMB EGFR AFRICAN AMERICAN: 113 ML/MIN/1.73M2
SL AMB EGFR NON AFRICAN AMERICAN: 97 ML/MIN/1.73M2
SODIUM SERPL-SCNC: 140 MMOL/L (ref 135–146)
TSH SERPL-ACNC: 1.55 MIU/L (ref 0.4–4.5)

## 2020-08-18 ENCOUNTER — TELEPHONE (OUTPATIENT)
Dept: ENDOCRINOLOGY | Facility: HOSPITAL | Age: 50
End: 2020-08-18

## 2020-08-18 DIAGNOSIS — Z79.4 TYPE 2 DIABETES MELLITUS WITH HYPERGLYCEMIA, WITH LONG-TERM CURRENT USE OF INSULIN (HCC): Primary | ICD-10-CM

## 2020-08-18 DIAGNOSIS — E11.65 TYPE 2 DIABETES MELLITUS WITH HYPERGLYCEMIA, WITH LONG-TERM CURRENT USE OF INSULIN (HCC): Primary | ICD-10-CM

## 2020-08-18 DIAGNOSIS — E78.2 MIXED HYPERLIPIDEMIA: ICD-10-CM

## 2020-09-23 LAB
ALBUMIN SERPL-MCNC: 4.4 G/DL (ref 3.6–5.1)
ALBUMIN/GLOB SERPL: 2 (CALC) (ref 1–2.5)
ALP SERPL-CCNC: 77 U/L (ref 36–130)
ALT SERPL-CCNC: 15 U/L (ref 9–46)
AST SERPL-CCNC: 10 U/L (ref 10–40)
BILIRUB SERPL-MCNC: 0.4 MG/DL (ref 0.2–1.2)
BUN SERPL-MCNC: 14 MG/DL (ref 7–25)
BUN/CREAT SERPL: ABNORMAL (CALC) (ref 6–22)
CALCIUM SERPL-MCNC: 9.6 MG/DL (ref 8.6–10.3)
CHLORIDE SERPL-SCNC: 103 MMOL/L (ref 98–110)
CHOLEST SERPL-MCNC: 133 MG/DL
CHOLEST/HDLC SERPL: 3.3 (CALC)
CO2 SERPL-SCNC: 27 MMOL/L (ref 20–32)
CREAT SERPL-MCNC: 0.98 MG/DL (ref 0.6–1.35)
EST. AVERAGE GLUCOSE BLD GHB EST-MCNC: 174 (CALC)
EST. AVERAGE GLUCOSE BLD GHB EST-SCNC: 9.7 (CALC)
GLOBULIN SER CALC-MCNC: 2.2 G/DL (CALC) (ref 1.9–3.7)
GLUCOSE SERPL-MCNC: 160 MG/DL (ref 65–99)
HBA1C MFR BLD: 7.7 % OF TOTAL HGB
HDLC SERPL-MCNC: 40 MG/DL
LDLC SERPL CALC-MCNC: 77 MG/DL (CALC)
NONHDLC SERPL-MCNC: 93 MG/DL (CALC)
POTASSIUM SERPL-SCNC: 4.3 MMOL/L (ref 3.5–5.3)
PROT SERPL-MCNC: 6.6 G/DL (ref 6.1–8.1)
SL AMB EGFR AFRICAN AMERICAN: 104 ML/MIN/1.73M2
SL AMB EGFR NON AFRICAN AMERICAN: 90 ML/MIN/1.73M2
SODIUM SERPL-SCNC: 139 MMOL/L (ref 135–146)
TRIGL SERPL-MCNC: 84 MG/DL

## 2020-09-24 ENCOUNTER — OFFICE VISIT (OUTPATIENT)
Dept: ENDOCRINOLOGY | Facility: HOSPITAL | Age: 50
End: 2020-09-24
Payer: MEDICARE

## 2020-09-24 VITALS
BODY MASS INDEX: 33.34 KG/M2 | DIASTOLIC BLOOD PRESSURE: 70 MMHG | SYSTOLIC BLOOD PRESSURE: 120 MMHG | HEIGHT: 73 IN | WEIGHT: 251.6 LBS | HEART RATE: 112 BPM | TEMPERATURE: 97.6 F

## 2020-09-24 DIAGNOSIS — E11.65 TYPE 2 DIABETES MELLITUS WITH HYPERGLYCEMIA, WITH LONG-TERM CURRENT USE OF INSULIN (HCC): Primary | ICD-10-CM

## 2020-09-24 DIAGNOSIS — I10 ESSENTIAL HYPERTENSION: ICD-10-CM

## 2020-09-24 DIAGNOSIS — Z79.4 TYPE 2 DIABETES MELLITUS WITH HYPERGLYCEMIA, WITH LONG-TERM CURRENT USE OF INSULIN (HCC): Primary | ICD-10-CM

## 2020-09-24 DIAGNOSIS — E11.42 DIABETIC POLYNEUROPATHY ASSOCIATED WITH TYPE 2 DIABETES MELLITUS (HCC): ICD-10-CM

## 2020-09-24 DIAGNOSIS — E78.2 MIXED HYPERLIPIDEMIA: ICD-10-CM

## 2020-09-24 PROCEDURE — 99215 OFFICE O/P EST HI 40 MIN: CPT | Performed by: INTERNAL MEDICINE

## 2020-09-24 NOTE — PROGRESS NOTES
9/24/2020    Assessment/Plan      Diagnoses and all orders for this visit:    Type 2 diabetes mellitus with hyperglycemia, with long-term current use of insulin (HCC)  -     Empagliflozin 10 MG TABS; Take 1 tablet (10 mg total) by mouth every morning  -     HEMOGLOBIN A1C W/ EAG ESTIMATION Lab Collect; Future  -     Comprehensive metabolic panel Lab Collect; Future    Diabetic polyneuropathy associated with type 2 diabetes mellitus (HCC)  -     HEMOGLOBIN A1C W/ EAG ESTIMATION Lab Collect; Future  -     Comprehensive metabolic panel Lab Collect; Future    Essential hypertension  -     HEMOGLOBIN A1C W/ EAG ESTIMATION Lab Collect; Future  -     Comprehensive metabolic panel Lab Collect; Future    Mixed hyperlipidemia  -     HEMOGLOBIN A1C W/ EAG ESTIMATION Lab Collect; Future  -     Comprehensive metabolic panel Lab Collect; Future        Assessment/Plan:  1  Type 2 diabetes, insulin requiring  Most recent hemoglobin A1c has improved to 7 7% from 8 9% last visit  It is not yet at goal   He is taking his insulin appropriately at this point  He was interested in medications that might help him lose weight with his insulin resistance  I have elected instead try discontinuing his pioglitazone and adding jardiance 10 mg daily which may help him lose weight by this switch in medications and also for is cardioprotective properties  He will continue the same metformin, Lantus insulin, and Humalog insulin  He will continue to test his blood sugars 2 to 3 times a day  He will continue to work on regular exercise and dietary changes  I offered medical nutrition therapy which he has declined at this point  2  Diabetic neuropathy  He has no neuropathic symptoms and sees a podiatrist on a regular basis  3  Hypertension  Blood pressure is under good control on his current dose of lisinopril  4  Hyperlipidemia  His most recent lipid profile is excellent  He will continue the same simvastatin 40 mg daily      I have asked him to follow up in 3 months with preceding hemoglobin A1c and CMP  CC: Diabetes type 2, blood pressure, lipid follow-up    History of Present Illness     HPI: Erendira Lobo is a 53y o  year old male with type 2 diabetes, insulin requiring with neuropathy for At least 25 years years, hypertension, hyperlipidemia for follow-up visit  He is on oral agents and insulin at home and takes metformin 500 mg 2 tablets twice a day, pioglitazone 15 mg daily, Lantus insulin 18 units daily, and Humalog insulin 8 units at breakfast, 4 units at lunch and supper  He denies any polyuria, polydipsia, nocturia, and polyphagia  He has some blurry vision  He denies chest pain or shortness of breath  He denies numbness or tingling of the feet  He denies nephropathy, retinopathy, heart attack, stroke and claudication but does admit to neuropathy  Not exercising as much as he would like  Still concerned about his weight  Hypoglycemic episodes: Yes infrequent  The patient's last eye exam was in March 2020 with no retinopathy  The patient's last foot exam was in March 2020 at endocrine office visit  Sees podiatry regularly with last visit last week  Last A1C was   Lab Results   Component Value Date    HGBA1C 7 7 (H) 09/22/2020     Blood Sugar/Glucometer/Pump/CGM review: checks blood sugars 1-2 times a day in am and hs  Before breakfast: primarily over 150  Before lunch:   Before dinner:   Bedtime: primarily over 150    He has hyperlipidemia and takes simvastatin 40 mg daily  He denies chest pain or shortness of breath  He has hypertension and takes lisinopril 10 mg daily  He denies headache or stroke-like symptoms  Review of Systems   Constitutional: Positive for fatigue and unexpected weight change  Weight up 7 lb since last visit  Wakes up tired  HENT: Negative for trouble swallowing  Eyes: Positive for visual disturbance  Slight blurry vision  Wears glasses   To see eye doctor in October  Respiratory: Negative for chest tightness and shortness of breath  Cardiovascular: Negative for chest pain and leg swelling  Gastrointestinal: Negative for abdominal pain, constipation, diarrhea and nausea  Endocrine: Negative for polydipsia, polyphagia and polyuria  No nocturia  Skin: Negative for wound  Neurological: Negative for dizziness, weakness, light-headedness, numbness and headaches  Psychiatric/Behavioral: Positive for sleep disturbance  Only sleeps about 4 hours a night         Historical Information   Past Medical History:   Diagnosis Date    Depression     Diabetic frozen shoulder associated with type 2 diabetes mellitus (HCC)     bilaterally    Glaucoma     [facility]      Past Surgical History:   Procedure Laterality Date    TOOTH EXTRACTION       Social History   Social History     Substance and Sexual Activity   Alcohol Use No     Social History     Substance and Sexual Activity   Drug Use No     Social History     Tobacco Use   Smoking Status Current Every Day Smoker    Packs/day: 1 00    Types: Cigarettes   Smokeless Tobacco Never Used     Family History:   Family History   Problem Relation Age of Onset    Breast cancer Mother     Diabetes unspecified Father     Hypertension Father     Glaucoma Father     No Known Problems Brother     Diabetes unspecified Paternal Aunt     No Known Problems Brother        Meds/Allergies   Current Outpatient Medications   Medication Sig Dispense Refill    citalopram (CeleXA) 20 mg tablet Take 20 mg by mouth daily        clonazePAM (KlonoPIN) 0 5 mg tablet 1 mg daily at bedtime       insulin lispro (HumaLOG KwikPen) 100 units/mL injection pen 8 units with breakfast and 4 units with lunch and supper and extra insulin if sugar over 200 5 pen 6    Insulin Pen Needle (Pro Comfort Pen Needles) 32G X 6 MM MISC Use to give insulin 4 times a day 200 each 6    LANTUS SOLOSTAR 100 units/mL injection pen Inject 18 Units under the skin daily 10 pen 3    lisinopril (ZESTRIL) 10 mg tablet Take 1 tablet (10 mg total) by mouth daily 90 tablet 3    metFORMIN (GLUCOPHAGE) 500 mg tablet Take 2 tablets (1,000 mg total) by mouth 2 (two) times a day before meals 360 tablet 3    simvastatin (ZOCOR) 40 mg tablet Take 1 tablet (40 mg total) by mouth daily at bedtime 90 tablet 3    Suvorexant (Belsomra) 10 MG TABS Take by mouth daily at bedtime      TRUE METRIX BLOOD GLUCOSE TEST test strip Use as instructed up to 5 times a day  600 each 2    Empagliflozin 10 MG TABS Take 1 tablet (10 mg total) by mouth every morning 90 tablet 3     No current facility-administered medications for this visit  No Known Allergies    Objective   Vitals: Blood pressure 120/70, pulse (!) 112, temperature 97 6 °F (36 4 °C), height 6' 1" (1 854 m), weight 114 kg (251 lb 9 6 oz)  Invasive Devices     None                 Physical Exam  Vitals signs reviewed  Constitutional:       Appearance: Normal appearance  He is well-developed  He is obese  HENT:      Head: Normocephalic and atraumatic  Eyes:      Extraocular Movements: Extraocular movements intact  Conjunctiva/sclera: Conjunctivae normal    Neck:      Musculoskeletal: Normal range of motion and neck supple  Thyroid: No thyromegaly  Vascular: No carotid bruit  Comments: Thyroid normal in size  Cardiovascular:      Rate and Rhythm: Normal rate and regular rhythm  Pulses: Normal pulses  Heart sounds: Normal heart sounds  No murmur  Pulmonary:      Effort: Pulmonary effort is normal       Breath sounds: Normal breath sounds  No wheezing  Abdominal:      Palpations: Abdomen is soft  Musculoskeletal: Normal range of motion  General: No deformity  Right lower leg: No edema  Left lower leg: No edema  Comments: Callus medial 1st toe bilaterally  No ulcerations of the feet  Lymphadenopathy:      Cervical: No cervical adenopathy  Skin:     General: Skin is warm and dry  Findings: No erythema or rash  Neurological:      Mental Status: He is alert and oriented to person, place, and time  Deep Tendon Reflexes: Reflexes are normal and symmetric  The history was obtained from the review of the chart and from the patient  Lab Results:    Most recent Alc is  Lab Results   Component Value Date    HGBA1C 7 7 (H) 09/22/2020           CMP done on 09/22/2020 showed a glucose of 160 fasting but was otherwise normal   Lab Results   Component Value Date    CREATININE 0 98 09/22/2020    CREATININE 0 92 06/24/2020    CREATININE 1 01 03/12/2020    BUN 14 09/22/2020    K 4 3 09/22/2020     09/22/2020    CO2 27 09/22/2020     Total cholesterol 133, LDL cholesterol 77  Lab Results   Component Value Date    HDL 40 09/22/2020    TRIG 84 09/22/2020     Lab Results   Component Value Date    ALT 15 09/22/2020    AST 10 09/22/2020    ALKPHOS 77 09/22/2020     Lab Results   Component Value Date    TSH 1 55 06/24/2020         No future appointments

## 2020-09-24 NOTE — PATIENT INSTRUCTIONS
Hemoglobin A1c is 7 7%  This is improved from 8 8% last visit but still not quite at goal   Continue the same metformin, lantus insulin,  and novolog insulin  Stop pioglitazone and start jardiance 10 mg daily  Make sure to drink plenty of water when starting jardiance  Continue to test blood sugars at least twice a day  work on diet and regular exercise, just walking is fine  Follow-up in 3 months with Blood work

## 2020-11-04 ENCOUNTER — APPOINTMENT (OUTPATIENT)
Dept: RADIOLOGY | Facility: CLINIC | Age: 50
End: 2020-11-04
Payer: MEDICARE

## 2020-11-04 ENCOUNTER — OFFICE VISIT (OUTPATIENT)
Dept: OBGYN CLINIC | Facility: CLINIC | Age: 50
End: 2020-11-04
Payer: MEDICARE

## 2020-11-04 VITALS
TEMPERATURE: 98.7 F | DIASTOLIC BLOOD PRESSURE: 76 MMHG | HEIGHT: 73 IN | BODY MASS INDEX: 32.47 KG/M2 | SYSTOLIC BLOOD PRESSURE: 118 MMHG | HEART RATE: 111 BPM | WEIGHT: 245 LBS

## 2020-11-04 DIAGNOSIS — M75.42 IMPINGEMENT SYNDROME OF LEFT SHOULDER: ICD-10-CM

## 2020-11-04 DIAGNOSIS — M25.511 BILATERAL SHOULDER PAIN, UNSPECIFIED CHRONICITY: ICD-10-CM

## 2020-11-04 DIAGNOSIS — M75.41 IMPINGEMENT SYNDROME OF RIGHT SHOULDER: Primary | ICD-10-CM

## 2020-11-04 DIAGNOSIS — M25.512 BILATERAL SHOULDER PAIN, UNSPECIFIED CHRONICITY: ICD-10-CM

## 2020-11-04 PROCEDURE — 73030 X-RAY EXAM OF SHOULDER: CPT

## 2020-11-04 PROCEDURE — 99203 OFFICE O/P NEW LOW 30 MIN: CPT | Performed by: ORTHOPAEDIC SURGERY

## 2020-11-05 ENCOUNTER — EVALUATION (OUTPATIENT)
Dept: PHYSICAL THERAPY | Facility: CLINIC | Age: 50
End: 2020-11-05
Payer: MEDICARE

## 2020-11-05 DIAGNOSIS — M75.42 IMPINGEMENT SYNDROME OF LEFT SHOULDER: ICD-10-CM

## 2020-11-05 DIAGNOSIS — M25.511 BILATERAL SHOULDER PAIN, UNSPECIFIED CHRONICITY: ICD-10-CM

## 2020-11-05 DIAGNOSIS — M25.512 BILATERAL SHOULDER PAIN, UNSPECIFIED CHRONICITY: ICD-10-CM

## 2020-11-05 DIAGNOSIS — M75.41 IMPINGEMENT SYNDROME OF RIGHT SHOULDER: ICD-10-CM

## 2020-11-05 PROCEDURE — 97162 PT EVAL MOD COMPLEX 30 MIN: CPT | Performed by: PHYSICAL THERAPIST

## 2020-11-05 PROCEDURE — 97110 THERAPEUTIC EXERCISES: CPT | Performed by: PHYSICAL THERAPIST

## 2020-11-11 ENCOUNTER — APPOINTMENT (OUTPATIENT)
Dept: PHYSICAL THERAPY | Facility: CLINIC | Age: 50
End: 2020-11-11
Payer: MEDICARE

## 2020-11-13 ENCOUNTER — OFFICE VISIT (OUTPATIENT)
Dept: PHYSICAL THERAPY | Facility: CLINIC | Age: 50
End: 2020-11-13
Payer: MEDICARE

## 2020-11-13 DIAGNOSIS — M75.42 IMPINGEMENT SYNDROME OF LEFT SHOULDER: ICD-10-CM

## 2020-11-13 DIAGNOSIS — M25.512 BILATERAL SHOULDER PAIN, UNSPECIFIED CHRONICITY: Primary | ICD-10-CM

## 2020-11-13 DIAGNOSIS — M25.511 BILATERAL SHOULDER PAIN, UNSPECIFIED CHRONICITY: Primary | ICD-10-CM

## 2020-11-13 PROCEDURE — 97112 NEUROMUSCULAR REEDUCATION: CPT

## 2020-11-13 PROCEDURE — 97140 MANUAL THERAPY 1/> REGIONS: CPT

## 2020-11-18 ENCOUNTER — OFFICE VISIT (OUTPATIENT)
Dept: PHYSICAL THERAPY | Facility: CLINIC | Age: 50
End: 2020-11-18
Payer: MEDICARE

## 2020-11-18 DIAGNOSIS — M75.42 IMPINGEMENT SYNDROME OF LEFT SHOULDER: ICD-10-CM

## 2020-11-18 DIAGNOSIS — M75.41 IMPINGEMENT SYNDROME OF RIGHT SHOULDER: ICD-10-CM

## 2020-11-18 DIAGNOSIS — M25.512 BILATERAL SHOULDER PAIN, UNSPECIFIED CHRONICITY: Primary | ICD-10-CM

## 2020-11-18 DIAGNOSIS — M25.511 BILATERAL SHOULDER PAIN, UNSPECIFIED CHRONICITY: Primary | ICD-10-CM

## 2020-11-18 PROCEDURE — 97140 MANUAL THERAPY 1/> REGIONS: CPT | Performed by: PHYSICAL THERAPIST

## 2020-11-18 PROCEDURE — 97110 THERAPEUTIC EXERCISES: CPT | Performed by: PHYSICAL THERAPIST

## 2020-11-18 PROCEDURE — 97112 NEUROMUSCULAR REEDUCATION: CPT | Performed by: PHYSICAL THERAPIST

## 2020-11-20 ENCOUNTER — APPOINTMENT (OUTPATIENT)
Dept: PHYSICAL THERAPY | Facility: CLINIC | Age: 50
End: 2020-11-20
Payer: MEDICARE

## 2020-11-25 ENCOUNTER — APPOINTMENT (OUTPATIENT)
Dept: PHYSICAL THERAPY | Facility: CLINIC | Age: 50
End: 2020-11-25
Payer: MEDICARE

## 2020-11-27 ENCOUNTER — APPOINTMENT (OUTPATIENT)
Dept: PHYSICAL THERAPY | Facility: CLINIC | Age: 50
End: 2020-11-27
Payer: MEDICARE

## 2020-12-04 ENCOUNTER — OFFICE VISIT (OUTPATIENT)
Dept: PHYSICAL THERAPY | Facility: CLINIC | Age: 50
End: 2020-12-04
Payer: MEDICARE

## 2020-12-04 DIAGNOSIS — M75.42 IMPINGEMENT SYNDROME OF LEFT SHOULDER: ICD-10-CM

## 2020-12-04 DIAGNOSIS — M25.512 BILATERAL SHOULDER PAIN, UNSPECIFIED CHRONICITY: Primary | ICD-10-CM

## 2020-12-04 DIAGNOSIS — M75.41 IMPINGEMENT SYNDROME OF RIGHT SHOULDER: ICD-10-CM

## 2020-12-04 DIAGNOSIS — M25.511 BILATERAL SHOULDER PAIN, UNSPECIFIED CHRONICITY: Primary | ICD-10-CM

## 2020-12-04 PROCEDURE — 97112 NEUROMUSCULAR REEDUCATION: CPT | Performed by: PHYSICAL THERAPIST

## 2020-12-04 PROCEDURE — 97140 MANUAL THERAPY 1/> REGIONS: CPT | Performed by: PHYSICAL THERAPIST

## 2020-12-04 PROCEDURE — 97110 THERAPEUTIC EXERCISES: CPT | Performed by: PHYSICAL THERAPIST

## 2020-12-09 ENCOUNTER — OFFICE VISIT (OUTPATIENT)
Dept: PHYSICAL THERAPY | Facility: CLINIC | Age: 50
End: 2020-12-09
Payer: MEDICARE

## 2020-12-09 DIAGNOSIS — M75.42 IMPINGEMENT SYNDROME OF LEFT SHOULDER: ICD-10-CM

## 2020-12-09 DIAGNOSIS — M25.511 BILATERAL SHOULDER PAIN, UNSPECIFIED CHRONICITY: Primary | ICD-10-CM

## 2020-12-09 DIAGNOSIS — M25.512 BILATERAL SHOULDER PAIN, UNSPECIFIED CHRONICITY: Primary | ICD-10-CM

## 2020-12-09 PROCEDURE — 97112 NEUROMUSCULAR REEDUCATION: CPT

## 2020-12-09 PROCEDURE — 97110 THERAPEUTIC EXERCISES: CPT

## 2020-12-09 PROCEDURE — 97140 MANUAL THERAPY 1/> REGIONS: CPT

## 2020-12-11 ENCOUNTER — OFFICE VISIT (OUTPATIENT)
Dept: PHYSICAL THERAPY | Facility: CLINIC | Age: 50
End: 2020-12-11
Payer: MEDICARE

## 2020-12-11 DIAGNOSIS — M75.42 IMPINGEMENT SYNDROME OF LEFT SHOULDER: ICD-10-CM

## 2020-12-11 DIAGNOSIS — M25.511 BILATERAL SHOULDER PAIN, UNSPECIFIED CHRONICITY: Primary | ICD-10-CM

## 2020-12-11 DIAGNOSIS — M75.41 IMPINGEMENT SYNDROME OF RIGHT SHOULDER: ICD-10-CM

## 2020-12-11 DIAGNOSIS — M25.512 BILATERAL SHOULDER PAIN, UNSPECIFIED CHRONICITY: Primary | ICD-10-CM

## 2020-12-11 PROCEDURE — 97140 MANUAL THERAPY 1/> REGIONS: CPT | Performed by: PHYSICAL THERAPIST

## 2020-12-11 PROCEDURE — 97110 THERAPEUTIC EXERCISES: CPT | Performed by: PHYSICAL THERAPIST

## 2020-12-11 PROCEDURE — 97112 NEUROMUSCULAR REEDUCATION: CPT | Performed by: PHYSICAL THERAPIST

## 2020-12-23 ENCOUNTER — APPOINTMENT (OUTPATIENT)
Dept: PHYSICAL THERAPY | Facility: CLINIC | Age: 50
End: 2020-12-23
Payer: MEDICARE

## 2020-12-23 LAB
ALBUMIN SERPL-MCNC: 4.3 G/DL (ref 3.6–5.1)
ALBUMIN/GLOB SERPL: 1.9 (CALC) (ref 1–2.5)
ALP SERPL-CCNC: 96 U/L (ref 36–130)
ALT SERPL-CCNC: 26 U/L (ref 9–46)
AST SERPL-CCNC: 19 U/L (ref 10–40)
BILIRUB SERPL-MCNC: 0.5 MG/DL (ref 0.2–1.2)
BUN SERPL-MCNC: 20 MG/DL (ref 7–25)
BUN/CREAT SERPL: ABNORMAL (CALC) (ref 6–22)
CALCIUM SERPL-MCNC: 9.3 MG/DL (ref 8.6–10.3)
CHLORIDE SERPL-SCNC: 104 MMOL/L (ref 98–110)
CO2 SERPL-SCNC: 27 MMOL/L (ref 20–32)
CREAT SERPL-MCNC: 0.88 MG/DL (ref 0.6–1.35)
EST. AVERAGE GLUCOSE BLD GHB EST-MCNC: 134 (CALC)
EST. AVERAGE GLUCOSE BLD GHB EST-SCNC: 7.4 (CALC)
GLOBULIN SER CALC-MCNC: 2.3 G/DL (CALC) (ref 1.9–3.7)
GLUCOSE SERPL-MCNC: 135 MG/DL (ref 65–99)
HBA1C MFR BLD: 6.3 % OF TOTAL HGB
POTASSIUM SERPL-SCNC: 4.1 MMOL/L (ref 3.5–5.3)
PROT SERPL-MCNC: 6.6 G/DL (ref 6.1–8.1)
SL AMB EGFR AFRICAN AMERICAN: 117 ML/MIN/1.73M2
SL AMB EGFR NON AFRICAN AMERICAN: 101 ML/MIN/1.73M2
SODIUM SERPL-SCNC: 139 MMOL/L (ref 135–146)

## 2020-12-30 ENCOUNTER — TELEPHONE (OUTPATIENT)
Dept: PHYSICAL THERAPY | Facility: CLINIC | Age: 50
End: 2020-12-30

## 2020-12-30 ENCOUNTER — OFFICE VISIT (OUTPATIENT)
Dept: OBGYN CLINIC | Facility: CLINIC | Age: 50
End: 2020-12-30
Payer: MEDICARE

## 2020-12-30 VITALS
TEMPERATURE: 97.2 F | BODY MASS INDEX: 30.75 KG/M2 | WEIGHT: 232 LBS | DIASTOLIC BLOOD PRESSURE: 82 MMHG | HEIGHT: 73 IN | SYSTOLIC BLOOD PRESSURE: 130 MMHG

## 2020-12-30 DIAGNOSIS — M75.41 IMPINGEMENT SYNDROME OF RIGHT SHOULDER: Primary | ICD-10-CM

## 2020-12-30 DIAGNOSIS — M75.42 IMPINGEMENT SYNDROME OF LEFT SHOULDER: ICD-10-CM

## 2020-12-30 PROCEDURE — 99213 OFFICE O/P EST LOW 20 MIN: CPT | Performed by: ORTHOPAEDIC SURGERY

## 2021-01-14 ENCOUNTER — TELEMEDICINE (OUTPATIENT)
Dept: ENDOCRINOLOGY | Facility: HOSPITAL | Age: 51
End: 2021-01-14
Payer: MEDICARE

## 2021-01-14 VITALS — WEIGHT: 230 LBS | BODY MASS INDEX: 30.34 KG/M2

## 2021-01-14 DIAGNOSIS — I10 HYPERTENSION, UNSPECIFIED TYPE: ICD-10-CM

## 2021-01-14 DIAGNOSIS — Z79.4 TYPE 2 DIABETES MELLITUS WITH OTHER SPECIFIED COMPLICATION, WITH LONG-TERM CURRENT USE OF INSULIN (HCC): ICD-10-CM

## 2021-01-14 DIAGNOSIS — E11.49 OTHER DIABETIC NEUROLOGICAL COMPLICATION ASSOCIATED WITH TYPE 2 DIABETES MELLITUS (HCC): ICD-10-CM

## 2021-01-14 DIAGNOSIS — E11.69 TYPE 2 DIABETES MELLITUS WITH OTHER SPECIFIED COMPLICATION, WITH LONG-TERM CURRENT USE OF INSULIN (HCC): ICD-10-CM

## 2021-01-14 DIAGNOSIS — E78.2 MIXED HYPERLIPIDEMIA: ICD-10-CM

## 2021-01-14 DIAGNOSIS — E11.65 TYPE 2 DIABETES MELLITUS WITH HYPERGLYCEMIA, WITH LONG-TERM CURRENT USE OF INSULIN (HCC): Primary | ICD-10-CM

## 2021-01-14 DIAGNOSIS — Z79.4 TYPE 2 DIABETES MELLITUS WITH HYPERGLYCEMIA, WITH LONG-TERM CURRENT USE OF INSULIN (HCC): Primary | ICD-10-CM

## 2021-01-14 DIAGNOSIS — I10 ESSENTIAL HYPERTENSION: ICD-10-CM

## 2021-01-14 DIAGNOSIS — E11.42 DIABETIC POLYNEUROPATHY ASSOCIATED WITH TYPE 2 DIABETES MELLITUS (HCC): ICD-10-CM

## 2021-01-14 DIAGNOSIS — E78.00 HYPERCHOLESTEROLEMIA: ICD-10-CM

## 2021-01-14 PROCEDURE — 99214 OFFICE O/P EST MOD 30 MIN: CPT | Performed by: INTERNAL MEDICINE

## 2021-01-14 RX ORDER — QUETIAPINE FUMARATE 300 MG/1
600 TABLET, FILM COATED ORAL
COMMUNITY

## 2021-01-14 RX ORDER — INSULIN GLARGINE 100 [IU]/ML
16 INJECTION, SOLUTION SUBCUTANEOUS DAILY
Qty: 10 PEN | Refills: 3 | Status: SHIPPED | OUTPATIENT
Start: 2021-01-14 | End: 2021-12-08

## 2021-01-14 RX ORDER — INSULIN LISPRO 100 [IU]/ML
INJECTION, SOLUTION INTRAVENOUS; SUBCUTANEOUS
Qty: 5 PEN | Refills: 6 | Status: SHIPPED | OUTPATIENT
Start: 2021-01-14 | End: 2022-02-24 | Stop reason: SDUPTHER

## 2021-01-14 RX ORDER — PEN NEEDLE, DIABETIC 32GX 5/32"
NEEDLE, DISPOSABLE MISCELLANEOUS
Qty: 200 EACH | Refills: 6 | Status: SHIPPED | OUTPATIENT
Start: 2021-01-14 | End: 2022-02-24 | Stop reason: SDUPTHER

## 2021-01-14 RX ORDER — LATANOPROST 50 UG/ML
1 SOLUTION/ DROPS OPHTHALMIC
COMMUNITY

## 2021-01-14 RX ORDER — SIMVASTATIN 40 MG
40 TABLET ORAL
Qty: 90 TABLET | Refills: 3 | Status: SHIPPED | OUTPATIENT
Start: 2021-01-14 | End: 2022-02-07

## 2021-01-14 RX ORDER — CALCIUM CITRATE/VITAMIN D3 200MG-6.25
TABLET ORAL
Qty: 600 EACH | Refills: 2 | Status: SHIPPED | OUTPATIENT
Start: 2021-01-14 | End: 2022-06-13

## 2021-01-14 RX ORDER — LISINOPRIL 10 MG/1
10 TABLET ORAL DAILY
Qty: 90 TABLET | Refills: 3 | Status: SHIPPED | OUTPATIENT
Start: 2021-01-14 | End: 2022-02-07

## 2021-01-14 NOTE — PROGRESS NOTES
Virtual Regular Visit      Assessment/Plan:    Problem List Items Addressed This Visit        Endocrine    Type 2 diabetes mellitus with hyperglycemia, with long-term current use of insulin (HCC) - Primary    Relevant Medications    Empagliflozin 10 MG TABS    insulin lispro (HumaLOG KwikPen) 100 units/mL injection pen    Lantus SoloStar 100 units/mL injection pen    metFORMIN (GLUCOPHAGE) 500 mg tablet    Insulin Pen Needle (Pro Comfort Pen Needles) 32G X 6 MM MISC    True Metrix Blood Glucose Test test strip    Other Relevant Orders    HEMOGLOBIN A1C W/ EAG ESTIMATION Lab Collect    Comprehensive metabolic panel Lab Collect    Lipid Panel with Direct LDL reflex Lab Collect    Microalbumin / creatinine urine ratio Lab Collect    Diabetic neuropathy associated with type 2 diabetes mellitus (HCC)    Relevant Medications    Empagliflozin 10 MG TABS    insulin lispro (HumaLOG KwikPen) 100 units/mL injection pen    Lantus SoloStar 100 units/mL injection pen    metFORMIN (GLUCOPHAGE) 500 mg tablet    True Metrix Blood Glucose Test test strip    Other Relevant Orders    HEMOGLOBIN A1C W/ EAG ESTIMATION Lab Collect    Comprehensive metabolic panel Lab Collect    Lipid Panel with Direct LDL reflex Lab Collect    Microalbumin / creatinine urine ratio Lab Collect       Cardiovascular and Mediastinum    Hypertension    Relevant Medications    lisinopril (ZESTRIL) 10 mg tablet    Other Relevant Orders    HEMOGLOBIN A1C W/ EAG ESTIMATION Lab Collect    Comprehensive metabolic panel Lab Collect    Lipid Panel with Direct LDL reflex Lab Collect    Microalbumin / creatinine urine ratio Lab Collect       Other    Hyperlipidemia    Relevant Medications    simvastatin (ZOCOR) 40 mg tablet    Other Relevant Orders    HEMOGLOBIN A1C W/ EAG ESTIMATION Lab Collect    Comprehensive metabolic panel Lab Collect    Lipid Panel with Direct LDL reflex Lab Collect    Microalbumin / creatinine urine ratio Lab Collect      Other Visit Diagnoses Type 2 diabetes mellitus with other specified complication, with long-term current use of insulin (HCC)        Relevant Medications    Empagliflozin 10 MG TABS    insulin lispro (HumaLOG KwikPen) 100 units/mL injection pen    Lantus SoloStar 100 units/mL injection pen    metFORMIN (GLUCOPHAGE) 500 mg tablet    True Metrix Blood Glucose Test test strip    Hypercholesterolemia        Relevant Medications    simvastatin (ZOCOR) 40 mg tablet        Assessment and plan:  1  Type 2 diabetes, insulin requiring  His most recent hemoglobin A1c is 6 3%  This does demonstrate excellent control of his diabetes  He is starting to have some low blood sugars with the addition of Jardiance so I will have him decrease his Lantus insulin to 16 units daily  He will continue the same Jardiance and metformin  He will continue to utilize Humalog insulin 4 units at a meal as needed for higher blood sugars  He will continue to test his blood sugars 3 to 4 times a day  2  Diabetic neuropathy  He denies neuropathic symptoms  Diabetic foot exams are up-to-date  He does see a podiatrist regularly  3  Hypertension  His blood pressure could not be evaluated today as this was a virtual visit  He will continue the same lisinopril 10 mg daily  4  Hyperlipidemia  He will continue the same simvastatin 40 mg daily I will repeat a lipid profile with his next visit  I have asked him to follow up in 3 months with preceding hemoglobin A1c, CMP, lipid panel, and urine microalbumin to creatinine ratio  Reason for visit is   Chief Complaint   Patient presents with    Virtual Regular Visit    And type 2 diabetes, blood pressure, lipid follow-up    Encounter provider Deitra Mortimer, MD    Provider located at [address] [place] [address] [address] [place] [address]      Recent Visits  No visits were found meeting these conditions     Showing recent visits within past 7 days and meeting all other requirements     Today's Visits  Date Type Provider Dept   01/14/21 Telemedicine Debbie Cornelius MD [facility]   Showing today's visits and meeting all other requirements     Future Appointments  No visits were found meeting these conditions  Showing future appointments within next 150 days and meeting all other requirements        The patient was identified by name and date of birth  Desiree Ceballos was informed that this is a telemedicine visit and that the visit is being conducted through telephone  My office door was closed  No one else was in the room  He acknowledged consent and understanding of privacy and security of the video platform  The patient has agreed to participate and understands they can discontinue the visit at any time  Patient is aware this is a billable service  Subjective  Desiree Ceballos is a 53 y o  male with a history of type 2 diabetes, insulin requiring with diabetic neuropathy, hypertension, hyperlipidemia for follow-up visit via telephone telemedicine   He was diagnosed with type 2 diabetes at least 25 years ago and takes insulin and oral agents  He utilizes metformin 500 mg 2 tablets twice a day, Jardiance 10 mg daily, Lantus insulin 18 units daily, and Humalog insulin 4 units with meals as needed  He reports that since the addition of Jardiance, his blood sugars have gone down quite a bit and he is not been using his Humalog insulin very much less sugars are 130s or higher  He denies polyuria, polydipsia, polyphagia, or nocturia  He denies blurry vision  He denies numbness or tingling of the feet  He denies chest pain or shortness of breath  Diabetic complications include diabetic neuropathy  He denies diabetic nephropathy, retinopathy, heart attack, stroke, or claudication  Last diabetic foot exam was March 2020 in the endocrine office    He did see a podiatrist for diabetic foot care in December 2020  He does see a podiatrist regularly  Last diabetic eye exam was March 2020 and he says he has an appointment soon  Home and takes Lantus Xalatan eye drops  Blood sugars are checked at least 2-3 or 4 times a day  He reports that sugars are mostly in the 100s but he is having a lot of low blood sugars throughout the day including in the morning  He unfortunately broke his meter couple days ago and is waiting for a new 1  His blood sugars have dropped over the last month  He has hyperlipidemia and takes simvastatin 40 mg daily  He denies chest pain or shortness of breath  He has hypertension and takes lisinopril 10 mg daily  He denies headache  He denies stroke-like symptoms      HPI     Past Medical History:   Diagnosis Date    Depression     Diabetic frozen shoulder associated with type 2 diabetes mellitus (HCC)     bilaterally    Glaucoma     Schizophrenia ([facility] [address] )        Past Surgical History:   Procedure Laterality Date    TOOTH EXTRACTION         Current Outpatient Medications   Medication Sig Dispense Refill    citalopram (CeleXA) 20 mg tablet Take 20 mg by mouth daily        clonazePAM (KlonoPIN) 0 5 mg tablet 1 mg daily at bedtime       Empagliflozin 10 MG TABS Take 1 tablet (10 mg total) by mouth every morning 30 tablet 6    insulin lispro (HumaLOG KwikPen) 100 units/mL injection pen 4 units with breakfast and 4 units with lunch and supper and extra insulin if sugar over 200 5 pen 6    Insulin Pen Needle (Pro Comfort Pen Needles) 32G X 6 MM MISC Use to give insulin 4 times a day 200 each 6    Lantus SoloStar 100 units/mL injection pen Inject 16 Units under the skin daily 10 pen 3    latanoprost (XALATAN) 0 005 % ophthalmic solution Administer 1 drop to both eyes daily at bedtime      lisinopril (ZESTRIL) 10 mg tablet Take 1 tablet (10 mg total) by mouth daily 90 tablet 3    metFORMIN (GLUCOPHAGE) 500 mg tablet Take 2 tablets (1,000 mg total) by mouth 2 (two) times a day before meals 360 tablet 3    QUEtiapine (SEROquel) 300 mg tablet Take 300 mg by mouth daily at bedtime      simvastatin (ZOCOR) 40 mg tablet Take 1 tablet (40 mg total) by mouth daily at bedtime 90 tablet 3    Suvorexant (Belsomra) 10 MG TABS Take by mouth daily at bedtime      True Metrix Blood Glucose Test test strip Use as instructed up to 5 times a day  600 each 2     No current facility-administered medications for this visit  No Known Allergies    Review of Systems   Constitutional: Positive for fatigue  Negative for unexpected weight change  Some fatigue  Reports weight down to 230 lb  HENT: Negative for trouble swallowing  Eyes: Negative for visual disturbance  Wearing glasses  Respiratory: Negative for chest tightness and shortness of breath  Cardiovascular: Negative for chest pain  Gastrointestinal: Negative for abdominal pain, constipation, diarrhea and nausea  Endocrine: Negative for polydipsia, polyphagia and polyuria  No nocturia  Skin: Negative for wound  Neurological: Negative for dizziness, weakness, light-headedness, numbness and headaches  Psychiatric/Behavioral: Positive for sleep disturbance  Troubles with waking up after 2 hours and having to take Belsomra to help him get back to sleep  Video Exam  It was my intent to perform this visit via video technology but the patient was not able to do a video connection so the visit was completed via audio telephone only  Vitals:    01/14/21 1130   Weight: 104 kg (230 lb)       Physical Exam was not performed as this was a telephone telemedicine visit  Blood work:  Blood work done on 12/22/2020 showed hemoglobin A1c of 6 3%      CMP demonstrated a glucose of 135 fasting but was otherwise normal     I spent 10 minutes directly with the patient during this visit      [address] [place] [address] Street acknowledges that he has consented to an online visit or consultation  He understands that the online visit is based solely on information provided by him, and that, in the absence of a face-to-face physical evaluation by the physician, the diagnosis he receives is both limited and provisional in terms of accuracy and completeness  This is not intended to replace a full medical face-to-face evaluation by the physician  Jelani Whitney understands and accepts these terms

## 2021-01-14 NOTE — PATIENT INSTRUCTIONS
Hemoglobin A1c is 6 3%  This is excellent  Because of the lower blood sugars, decrease Lantus insulin to 16 units daily  Continue the same Jardiance 10 mg daily and metformin 1000 mg twice a day  Continue to utilize Humalog insulin 4 units with meals when sugars are higher  Continue to test blood sugars at least 3 or 4 times a day  Follow-up in 3 months with blood work

## 2021-01-30 LAB
LEFT EYE DIABETIC RETINOPATHY: NORMAL
RIGHT EYE DIABETIC RETINOPATHY: NORMAL

## 2021-04-07 LAB
ALBUMIN SERPL-MCNC: 4.4 G/DL (ref 3.6–5.1)
ALBUMIN/CREAT UR: 9 MCG/MG CREAT
ALBUMIN/GLOB SERPL: 2.1 (CALC) (ref 1–2.5)
ALP SERPL-CCNC: 93 U/L (ref 36–130)
ALT SERPL-CCNC: 70 U/L (ref 9–46)
AST SERPL-CCNC: 47 U/L (ref 10–40)
BILIRUB SERPL-MCNC: 0.5 MG/DL (ref 0.2–1.2)
BUN SERPL-MCNC: 15 MG/DL (ref 7–25)
BUN/CREAT SERPL: ABNORMAL (CALC) (ref 6–22)
CALCIUM SERPL-MCNC: 9.5 MG/DL (ref 8.6–10.3)
CHLORIDE SERPL-SCNC: 102 MMOL/L (ref 98–110)
CHOLEST SERPL-MCNC: 146 MG/DL
CHOLEST/HDLC SERPL: 3 (CALC)
CO2 SERPL-SCNC: 27 MMOL/L (ref 20–32)
CREAT SERPL-MCNC: 0.98 MG/DL (ref 0.6–1.35)
CREAT UR-MCNC: 64 MG/DL (ref 20–320)
EST. AVERAGE GLUCOSE BLD GHB EST-MCNC: 148 (CALC)
EST. AVERAGE GLUCOSE BLD GHB EST-SCNC: 8.2 (CALC)
GLOBULIN SER CALC-MCNC: 2.1 G/DL (CALC) (ref 1.9–3.7)
GLUCOSE SERPL-MCNC: 203 MG/DL (ref 65–99)
HBA1C MFR BLD: 6.8 % OF TOTAL HGB
HDLC SERPL-MCNC: 48 MG/DL
LDLC SERPL CALC-MCNC: 72 MG/DL (CALC)
MICROALBUMIN UR-MCNC: 0.6 MG/DL
NONHDLC SERPL-MCNC: 98 MG/DL (CALC)
POTASSIUM SERPL-SCNC: 4.2 MMOL/L (ref 3.5–5.3)
PROT SERPL-MCNC: 6.5 G/DL (ref 6.1–8.1)
SL AMB EGFR AFRICAN AMERICAN: 104 ML/MIN/1.73M2
SL AMB EGFR NON AFRICAN AMERICAN: 90 ML/MIN/1.73M2
SODIUM SERPL-SCNC: 139 MMOL/L (ref 135–146)
TRIGL SERPL-MCNC: 179 MG/DL

## 2021-08-04 ENCOUNTER — OFFICE VISIT (OUTPATIENT)
Dept: ENDOCRINOLOGY | Facility: HOSPITAL | Age: 51
End: 2021-08-04
Payer: MEDICARE

## 2021-08-04 VITALS
HEART RATE: 111 BPM | SYSTOLIC BLOOD PRESSURE: 108 MMHG | BODY MASS INDEX: 29.37 KG/M2 | DIASTOLIC BLOOD PRESSURE: 70 MMHG | WEIGHT: 221.6 LBS | HEIGHT: 73 IN

## 2021-08-04 DIAGNOSIS — Z79.4 TYPE 2 DIABETES MELLITUS WITH HYPERGLYCEMIA, WITH LONG-TERM CURRENT USE OF INSULIN (HCC): Primary | ICD-10-CM

## 2021-08-04 DIAGNOSIS — E11.65 TYPE 2 DIABETES MELLITUS WITH HYPERGLYCEMIA, WITH LONG-TERM CURRENT USE OF INSULIN (HCC): Primary | ICD-10-CM

## 2021-08-04 PROCEDURE — 99214 OFFICE O/P EST MOD 30 MIN: CPT | Performed by: PHYSICIAN ASSISTANT

## 2021-08-04 RX ORDER — ZIPRASIDONE HYDROCHLORIDE 80 MG/1
80 CAPSULE ORAL 2 TIMES DAILY WITH MEALS
COMMUNITY
Start: 2021-07-20

## 2021-08-04 RX ORDER — BENZTROPINE MESYLATE 1 MG/1
1 TABLET ORAL 2 TIMES DAILY PRN
COMMUNITY
Start: 2021-07-20

## 2021-08-04 NOTE — PROGRESS NOTES
May Jerome 49 y o  male MRN: [id number]    Encounter: [id number]      Assessment/Plan     Assessment: This is a 49y o -year-old male with type 2 diabetes with neuropathy, hypertension and hyperlipidemia  Plan:    1  Type 2 diabetes:  No lab work completed prior to this office visit, but last hemoglobin A1c was 6 8  This did increase from A1c in January 2021  He has not been taking his Humalog recently  Advise that he get lab work completed at earliest convenience  If A1c did increase, would recommend trying glimepiride since he does not recall being on that medication in the past   Encouraged him to check blood sugar at least twice a day, at alternating times may help  Send in blood sugar logs in 2 weeks for review see if we can adjust medications  Continue with lifestyle modifications  Follow-up in 3 months with lab work completed prior to visit  2  Diabetic neuropathy:  Symptoms stable  Is up-to-date on diabetic foot exam     3  Hypertension:   Normotensive in the office today  Kidney function remains stable with normal electrolytes  Continue with current medications  Repeat CMP prior to next office visit  4  Hyperlipidemia:  Previous lipid panel showed slightly elevated triglycerides, but normal total cholesterol, HDL, LDL  Continue with current medications  Will continue monitor at this time  CC:  Type 2 diabetes follow-up    History of Present Illness     HPI:  May Jerome is a 49 y o  male with type 2 diabetes, insulin requiring with diabetic neuropathy, hypertension, hyperlipidemia for follow-up visit  He was diagnosed with type 2 diabetes at least 25 years ago and takes insulin and oral agents  He utilizes metformin 500 mg 2 tablets twice a day, Jardiance 10 mg daily, Lantus insulin 18 units daily, and Humalog insulin 4 units with meals as needed  He states recently he has not been using any Humalog at all  Would like to know what his A1c is without it    He denies polyuria, polydipsia, polyphagia, or nocturia  He denies blurry vision  He denies numbness or tingling of the feet  He denies chest pain or shortness of breath  States that he remains physically active  Has not made any dietary changes over the last few months      Diabetic complications include diabetic neuropathy  He denies diabetic nephropathy, retinopathy, heart attack, stroke, or claudication      Last diabetic foot exam was March 2021  He did see a podiatrist for diabetic foot care in December 2020  He does see a podiatrist regularly      Last diabetic eye exam was  January 2021 and he says he has an appointment soon  At home take Xalatan eye drops        Blood sugars are checked at least 2-3 or 4 times a day  No logs present at today's office visit  Reports recent that blood sugar has been maintaining around 150      He has hyperlipidemia and takes simvastatin 40 mg daily  He denies chest pain or shortness of breath      He has hypertension and takes lisinopril 10 mg daily  He denies headache  He denies stroke-like symptoms  Review of Systems   Constitutional: Negative for activity change, appetite change, fatigue and unexpected weight change  HENT: Negative for trouble swallowing  Eyes: Negative for visual disturbance  Respiratory: Negative for chest tightness and shortness of breath  Cardiovascular: Negative for chest pain, palpitations and leg swelling  Gastrointestinal: Negative for abdominal pain, diarrhea, nausea and vomiting  Endocrine: Negative for cold intolerance, heat intolerance, polydipsia, polyphagia and polyuria  Genitourinary: Negative for frequency  Skin: Negative for rash and wound  Neurological: Negative for dizziness, weakness, light-headedness, numbness and headaches  Psychiatric/Behavioral: Negative for dysphoric mood and sleep disturbance  The patient is not nervous/anxious          Historical Information   Past Medical History:   Diagnosis Date    Depression     Diabetic frozen shoulder associated with type 2 diabetes mellitus (HCC)     bilaterally    Glaucoma     Schizophrenia ([address] )      Past Surgical History:   Procedure Laterality Date    TOOTH EXTRACTION       Social History   Social History     Substance and Sexual Activity   Alcohol Use No     Social History     Substance and Sexual Activity   Drug Use No     Social History     Tobacco Use   Smoking Status Current Every Day Smoker    Packs/day: 1 00    Types: Cigarettes   Smokeless Tobacco Never Used     Family History:   Family History   Problem Relation Age of Onset    Breast cancer Mother     Diabetes unspecified Father     Hypertension Father     Glaucoma Father     No Known Problems Brother     Diabetes unspecified Paternal Aunt     No Known Problems Brother        Meds/Allergies   Current Outpatient Medications   Medication Sig Dispense Refill    benztropine (COGENTIN) 1 mg tablet Take 1 mg by mouth 2 (two) times a day as needed Patient unsure of dosage      citalopram (CeleXA) 20 mg tablet Take 20 mg by mouth daily        clonazePAM (KlonoPIN) 0 5 mg tablet 1 mg daily at bedtime       Empagliflozin 10 MG TABS Take 1 tablet (10 mg total) by mouth every morning 30 tablet 6    insulin lispro (HumaLOG KwikPen) 100 units/mL injection pen 4 units with breakfast and 4 units with lunch and supper and extra insulin if sugar over 200 5 pen 6    Insulin Pen Needle (Pro Comfort Pen Needles) 32G X 6 MM MISC Use to give insulin 4 times a day 200 each 6    Lantus SoloStar 100 units/mL injection pen Inject 16 Units under the skin daily 10 pen 3    latanoprost (XALATAN) 0 005 % ophthalmic solution Administer 1 drop to both eyes daily at bedtime      lisinopril (ZESTRIL) 10 mg tablet Take 1 tablet (10 mg total) by mouth daily 90 tablet 3    metFORMIN (GLUCOPHAGE) 500 mg tablet Take 2 tablets (1,000 mg total) by mouth 2 (two) times a day before meals 360 tablet 3    QUEtiapine (SEROquel) 300 mg tablet Take 300 mg by mouth daily at bedtime      simvastatin (ZOCOR) 40 mg tablet Take 1 tablet (40 mg total) by mouth daily at bedtime 90 tablet 3    Suvorexant (Belsomra) 10 MG TABS Take by mouth daily at bedtime      True Metrix Blood Glucose Test test strip Use as instructed up to 5 times a day  600 each 2    ziprasidone (GEODON) 80 mg capsule TAKE ONE CAPSULE BY MOUTH WITH BREAKFAST AND TAKE TWO CAPSULES AT DINNER TIME MUST TAKE WITH FOOD       No current facility-administered medications for this visit  No Known Allergies    Objective   Vitals: Blood pressure 108/70, pulse (!) 111, height 6' 1" (1 854 m), weight 101 kg (221 lb 9 6 oz)  Physical Exam  Vitals and nursing note reviewed  Constitutional:       General: He is not in acute distress  Appearance: Normal appearance  He is not diaphoretic  HENT:      Head: Normocephalic and atraumatic  Eyes:      General: No scleral icterus  Extraocular Movements: Extraocular movements intact  Conjunctiva/sclera: Conjunctivae normal       Pupils: Pupils are equal, round, and reactive to light  Cardiovascular:      Rate and Rhythm: Normal rate and regular rhythm  Heart sounds: No murmur heard  Pulmonary:      Effort: Pulmonary effort is normal  No respiratory distress  Breath sounds: Normal breath sounds  No wheezing  Musculoskeletal:      Cervical back: Normal range of motion  Right lower leg: No edema  Left lower leg: No edema  Lymphadenopathy:      Cervical: No cervical adenopathy  Skin:     General: Skin is warm and dry  Neurological:      Mental Status: He is alert and oriented to person, place, and time  Mental status is at baseline  Sensory: No sensory deficit  Psychiatric:         Mood and Affect: Mood normal          Behavior: Behavior normal          Thought Content: Thought content normal          The history was obtained from the review of the chart, patient      Lab Results:   Lab Results   Component Value Date/Time    Hemoglobin A1C 6 8 (H) 04/06/2021 09:21 AM    Hemoglobin A1C 6 3 (H) 12/22/2020 09:27 AM    Hemoglobin A1C 7 7 (H) 09/22/2020 09:29 AM    BUN 15 04/06/2021 09:21 AM    BUN 20 12/22/2020 09:27 AM    BUN 14 09/22/2020 09:29 AM    Potassium 4 2 04/06/2021 09:21 AM    Potassium 4 1 12/22/2020 09:27 AM    Potassium 4 3 09/22/2020 09:29 AM    Chloride 102 04/06/2021 09:21 AM    Chloride 104 12/22/2020 09:27 AM    Chloride 103 09/22/2020 09:29 AM    CO2 27 04/06/2021 09:21 AM    CO2 27 12/22/2020 09:27 AM    CO2 27 09/22/2020 09:29 AM    Creatinine 0 98 04/06/2021 09:21 AM    Creatinine 0 88 12/22/2020 09:27 AM    Creatinine 0 98 09/22/2020 09:29 AM    AST 47 (H) 04/06/2021 09:21 AM    AST 19 12/22/2020 09:27 AM    AST 10 09/22/2020 09:29 AM    ALT 70 (H) 04/06/2021 09:21 AM    ALT 26 12/22/2020 09:27 AM    ALT 15 09/22/2020 09:29 AM    Albumin 4 4 04/06/2021 09:21 AM    Albumin 4 3 12/22/2020 09:27 AM    Albumin 4 4 09/22/2020 09:29 AM    Globulin 2 1 04/06/2021 09:21 AM    Globulin 2 3 12/22/2020 09:27 AM    Globulin 2 2 09/22/2020 09:29 AM    HDL 48 04/06/2021 09:21 AM    HDL 40 09/22/2020 09:29 AM    Triglycerides 179 (H) 04/06/2021 09:21 AM    Triglycerides 84 09/22/2020 09:29 AM         Portions of the record may have been created with voice recognition software  Occasional wrong word or "sound a like" substitutions may have occurred due to the inherent limitations of voice recognition software  Read the chart carefully and recognize, using context, where substitutions have occurred

## 2021-09-15 DIAGNOSIS — Z79.4 TYPE 2 DIABETES MELLITUS WITH HYPERGLYCEMIA, WITH LONG-TERM CURRENT USE OF INSULIN (HCC): Primary | ICD-10-CM

## 2021-09-15 DIAGNOSIS — E11.65 TYPE 2 DIABETES MELLITUS WITH HYPERGLYCEMIA, WITH LONG-TERM CURRENT USE OF INSULIN (HCC): Primary | ICD-10-CM

## 2021-09-16 RX ORDER — ISOPRPOPYL ALCOHOL 70 ML/100ML
SWAB TOPICAL
Qty: 100 EACH | Refills: 5 | Status: SHIPPED | OUTPATIENT
Start: 2021-09-16 | End: 2022-03-08

## 2021-11-13 LAB
ALBUMIN SERPL-MCNC: 4.2 G/DL (ref 3.6–5.1)
ALBUMIN/GLOB SERPL: 1.7 (CALC) (ref 1–2.5)
ALP SERPL-CCNC: 113 U/L (ref 35–144)
ALT SERPL-CCNC: 64 U/L (ref 9–46)
AST SERPL-CCNC: 43 U/L (ref 10–35)
BILIRUB SERPL-MCNC: 0.6 MG/DL (ref 0.2–1.2)
BUN SERPL-MCNC: 17 MG/DL (ref 7–25)
BUN/CREAT SERPL: ABNORMAL (CALC) (ref 6–22)
CALCIUM SERPL-MCNC: 10.2 MG/DL (ref 8.6–10.3)
CHLORIDE SERPL-SCNC: 101 MMOL/L (ref 98–110)
CHOLEST SERPL-MCNC: 150 MG/DL
CHOLEST/HDLC SERPL: 3.2 (CALC)
CO2 SERPL-SCNC: 25 MMOL/L (ref 20–32)
CREAT SERPL-MCNC: 1.06 MG/DL (ref 0.7–1.33)
GLOBULIN SER CALC-MCNC: 2.5 G/DL (CALC) (ref 1.9–3.7)
GLUCOSE SERPL-MCNC: 127 MG/DL (ref 65–99)
HBA1C MFR BLD: 7.5 % OF TOTAL HGB
HDLC SERPL-MCNC: 47 MG/DL
LDLC SERPL CALC-MCNC: 78 MG/DL (CALC)
NONHDLC SERPL-MCNC: 103 MG/DL (CALC)
POTASSIUM SERPL-SCNC: 4.4 MMOL/L (ref 3.5–5.3)
PROT SERPL-MCNC: 6.7 G/DL (ref 6.1–8.1)
SL AMB EGFR AFRICAN AMERICAN: 94 ML/MIN/1.73M2
SL AMB EGFR NON AFRICAN AMERICAN: 81 ML/MIN/1.73M2
SODIUM SERPL-SCNC: 136 MMOL/L (ref 135–146)
TRIGL SERPL-MCNC: 149 MG/DL
TSH SERPL-ACNC: 1.85 MIU/L (ref 0.4–4.5)

## 2021-11-18 ENCOUNTER — TELEMEDICINE (OUTPATIENT)
Dept: ENDOCRINOLOGY | Facility: HOSPITAL | Age: 51
End: 2021-11-18
Payer: MEDICARE

## 2021-11-18 DIAGNOSIS — Z79.4 TYPE 2 DIABETES MELLITUS WITH HYPERGLYCEMIA, WITH LONG-TERM CURRENT USE OF INSULIN (HCC): Primary | ICD-10-CM

## 2021-11-18 DIAGNOSIS — E11.42 DIABETIC POLYNEUROPATHY ASSOCIATED WITH TYPE 2 DIABETES MELLITUS (HCC): ICD-10-CM

## 2021-11-18 DIAGNOSIS — I10 PRIMARY HYPERTENSION: ICD-10-CM

## 2021-11-18 DIAGNOSIS — E78.2 MIXED HYPERLIPIDEMIA: ICD-10-CM

## 2021-11-18 DIAGNOSIS — E11.65 TYPE 2 DIABETES MELLITUS WITH HYPERGLYCEMIA, WITH LONG-TERM CURRENT USE OF INSULIN (HCC): Primary | ICD-10-CM

## 2021-11-18 PROCEDURE — 99214 OFFICE O/P EST MOD 30 MIN: CPT | Performed by: PHYSICIAN ASSISTANT

## 2021-12-07 DIAGNOSIS — E11.69 TYPE 2 DIABETES MELLITUS WITH OTHER SPECIFIED COMPLICATION, WITH LONG-TERM CURRENT USE OF INSULIN (HCC): ICD-10-CM

## 2021-12-07 DIAGNOSIS — Z79.4 TYPE 2 DIABETES MELLITUS WITH OTHER SPECIFIED COMPLICATION, WITH LONG-TERM CURRENT USE OF INSULIN (HCC): ICD-10-CM

## 2021-12-07 DIAGNOSIS — E11.49 OTHER DIABETIC NEUROLOGICAL COMPLICATION ASSOCIATED WITH TYPE 2 DIABETES MELLITUS (HCC): ICD-10-CM

## 2021-12-08 RX ORDER — INSULIN GLARGINE 100 [IU]/ML
16 INJECTION, SOLUTION SUBCUTANEOUS DAILY
Qty: 15 ML | Refills: 3 | Status: SHIPPED | OUTPATIENT
Start: 2021-12-08 | End: 2022-02-24 | Stop reason: SDUPTHER

## 2022-02-06 DIAGNOSIS — E11.65 TYPE 2 DIABETES MELLITUS WITH HYPERGLYCEMIA, WITH LONG-TERM CURRENT USE OF INSULIN (HCC): ICD-10-CM

## 2022-02-06 DIAGNOSIS — E78.00 HYPERCHOLESTEROLEMIA: ICD-10-CM

## 2022-02-06 DIAGNOSIS — I10 HYPERTENSION, UNSPECIFIED TYPE: ICD-10-CM

## 2022-02-06 DIAGNOSIS — E11.69 TYPE 2 DIABETES MELLITUS WITH OTHER SPECIFIED COMPLICATION, WITH LONG-TERM CURRENT USE OF INSULIN (HCC): ICD-10-CM

## 2022-02-06 DIAGNOSIS — Z79.4 TYPE 2 DIABETES MELLITUS WITH OTHER SPECIFIED COMPLICATION, WITH LONG-TERM CURRENT USE OF INSULIN (HCC): ICD-10-CM

## 2022-02-06 DIAGNOSIS — E11.49 OTHER DIABETIC NEUROLOGICAL COMPLICATION ASSOCIATED WITH TYPE 2 DIABETES MELLITUS (HCC): ICD-10-CM

## 2022-02-06 DIAGNOSIS — Z79.4 TYPE 2 DIABETES MELLITUS WITH HYPERGLYCEMIA, WITH LONG-TERM CURRENT USE OF INSULIN (HCC): ICD-10-CM

## 2022-02-07 LAB
ALBUMIN SERPL-MCNC: 4.1 G/DL (ref 3.6–5.1)
ALBUMIN/GLOB SERPL: 1.5 (CALC) (ref 1–2.5)
ALP SERPL-CCNC: 101 U/L (ref 35–144)
ALT SERPL-CCNC: 15 U/L (ref 9–46)
AST SERPL-CCNC: 12 U/L (ref 10–35)
BILIRUB SERPL-MCNC: 0.5 MG/DL (ref 0.2–1.2)
BUN SERPL-MCNC: 19 MG/DL (ref 7–25)
BUN/CREAT SERPL: ABNORMAL (CALC) (ref 6–22)
CALCIUM SERPL-MCNC: 9.4 MG/DL (ref 8.6–10.3)
CHLORIDE SERPL-SCNC: 100 MMOL/L (ref 98–110)
CO2 SERPL-SCNC: 27 MMOL/L (ref 20–32)
CREAT SERPL-MCNC: 0.94 MG/DL (ref 0.7–1.33)
GLOBULIN SER CALC-MCNC: 2.8 G/DL (CALC) (ref 1.9–3.7)
GLUCOSE SERPL-MCNC: 128 MG/DL (ref 65–99)
HBA1C MFR BLD: 8.2 % OF TOTAL HGB
POTASSIUM SERPL-SCNC: 4.1 MMOL/L (ref 3.5–5.3)
PROT SERPL-MCNC: 6.9 G/DL (ref 6.1–8.1)
SL AMB EGFR AFRICAN AMERICAN: 109 ML/MIN/1.73M2
SL AMB EGFR NON AFRICAN AMERICAN: 94 ML/MIN/1.73M2
SODIUM SERPL-SCNC: 135 MMOL/L (ref 135–146)

## 2022-02-07 RX ORDER — SIMVASTATIN 40 MG
TABLET ORAL
Qty: 90 TABLET | Refills: 6 | Status: SHIPPED | OUTPATIENT
Start: 2022-02-07 | End: 2022-02-24 | Stop reason: SDUPTHER

## 2022-02-07 RX ORDER — EMPAGLIFLOZIN 10 MG/1
TABLET, FILM COATED ORAL
Qty: 30 TABLET | Refills: 6 | Status: SHIPPED | OUTPATIENT
Start: 2022-02-07 | End: 2022-02-24 | Stop reason: SDUPTHER

## 2022-02-07 RX ORDER — LISINOPRIL 10 MG/1
TABLET ORAL
Qty: 90 TABLET | Refills: 6 | Status: SHIPPED | OUTPATIENT
Start: 2022-02-07 | End: 2022-02-24 | Stop reason: SDUPTHER

## 2022-02-24 ENCOUNTER — OFFICE VISIT (OUTPATIENT)
Dept: ENDOCRINOLOGY | Facility: HOSPITAL | Age: 52
End: 2022-02-24
Payer: MEDICARE

## 2022-02-24 VITALS
WEIGHT: 214.8 LBS | HEIGHT: 73 IN | DIASTOLIC BLOOD PRESSURE: 80 MMHG | BODY MASS INDEX: 28.47 KG/M2 | SYSTOLIC BLOOD PRESSURE: 136 MMHG | HEART RATE: 88 BPM

## 2022-02-24 DIAGNOSIS — E11.49 OTHER DIABETIC NEUROLOGICAL COMPLICATION ASSOCIATED WITH TYPE 2 DIABETES MELLITUS (HCC): ICD-10-CM

## 2022-02-24 DIAGNOSIS — E11.65 TYPE 2 DIABETES MELLITUS WITH HYPERGLYCEMIA, WITH LONG-TERM CURRENT USE OF INSULIN (HCC): Primary | ICD-10-CM

## 2022-02-24 DIAGNOSIS — Z79.4 TYPE 2 DIABETES MELLITUS WITH OTHER SPECIFIED COMPLICATION, WITH LONG-TERM CURRENT USE OF INSULIN (HCC): ICD-10-CM

## 2022-02-24 DIAGNOSIS — I10 PRIMARY HYPERTENSION: ICD-10-CM

## 2022-02-24 DIAGNOSIS — E11.69 TYPE 2 DIABETES MELLITUS WITH OTHER SPECIFIED COMPLICATION, WITH LONG-TERM CURRENT USE OF INSULIN (HCC): ICD-10-CM

## 2022-02-24 DIAGNOSIS — E11.42 DIABETIC POLYNEUROPATHY ASSOCIATED WITH TYPE 2 DIABETES MELLITUS (HCC): ICD-10-CM

## 2022-02-24 DIAGNOSIS — Z79.4 TYPE 2 DIABETES MELLITUS WITH HYPERGLYCEMIA, WITH LONG-TERM CURRENT USE OF INSULIN (HCC): Primary | ICD-10-CM

## 2022-02-24 DIAGNOSIS — E78.2 MIXED HYPERLIPIDEMIA: ICD-10-CM

## 2022-02-24 DIAGNOSIS — E78.00 HYPERCHOLESTEROLEMIA: ICD-10-CM

## 2022-02-24 DIAGNOSIS — I10 HYPERTENSION, UNSPECIFIED TYPE: ICD-10-CM

## 2022-02-24 PROCEDURE — 99214 OFFICE O/P EST MOD 30 MIN: CPT | Performed by: PHYSICIAN ASSISTANT

## 2022-02-24 RX ORDER — PEN NEEDLE, DIABETIC 32GX 5/32"
NEEDLE, DISPOSABLE MISCELLANEOUS
Qty: 200 EACH | Refills: 6 | Status: SHIPPED | OUTPATIENT
Start: 2022-02-24

## 2022-02-24 RX ORDER — LISINOPRIL 10 MG/1
10 TABLET ORAL DAILY
Qty: 90 TABLET | Refills: 3 | Status: SHIPPED | OUTPATIENT
Start: 2022-02-24

## 2022-02-24 RX ORDER — INSULIN GLARGINE 100 [IU]/ML
16 INJECTION, SOLUTION SUBCUTANEOUS DAILY
Qty: 15 ML | Refills: 3 | Status: SHIPPED | OUTPATIENT
Start: 2022-02-24 | End: 2022-06-13 | Stop reason: SDUPTHER

## 2022-02-24 RX ORDER — INSULIN LISPRO 100 [IU]/ML
INJECTION, SOLUTION INTRAVENOUS; SUBCUTANEOUS
Qty: 15 ML | Refills: 1 | Status: SHIPPED | OUTPATIENT
Start: 2022-02-24 | End: 2022-04-04

## 2022-02-24 RX ORDER — SIMVASTATIN 40 MG
40 TABLET ORAL
Qty: 90 TABLET | Refills: 3 | Status: SHIPPED | OUTPATIENT
Start: 2022-02-24

## 2022-02-24 NOTE — PROGRESS NOTES
Lila Pritchett 49 y o  male MRN: [id number]    Encounter: [id number]      Assessment/Plan     Assessment: This is a 49y o -year-old male with type 2 diabetes with neuropathy, hypertension and hyperlipidemia  Plan:  1  Type 2 diabetes:  Most recent hemoglobin A1c has increased to 8 2  He has not been taking his Humalog recently  I recommend that he restart his Humalog at this time  I did discuss that being on insulin there are chances of him having hypoglycemia, and to have snacking carbohydrates on hand  If there are consistent episodes of hypoglycemia, then we need to make adjustments to his insulin  At this time he will continue with Lantus 18 units daily and restart Humalog 4 units with each meal   He will continue with Jardiance 10 mg daily, metformin 500 mg 2 tablets twice a day  Send in blood sugar logs in 2 weeks for review see if we can adjust medications  Continue with lifestyle modifications  Follow-up in 3 months with lab work completed prior to visit       2  Diabetic neuropathy:  Symptoms stable  Is up-to-date on diabetic foot exam      3  Hypertension:   Normotensive in the office today  Kidney function remains stable with normal electrolytes  Continue with current medications  Repeat CMP prior to next office visit      4  Hyperlipidemia:  Previous lipid panel showed slightly elevated triglycerides, but normal total cholesterol, HDL, LDL  Continue with current medications  Will continue monitor at this time  CC:  Type 2 diabetes follow-up    History of Present Illness     HPI:  Marychuy Boland a 49 y  o  male with type 2 diabetes, insulin requiring with diabetic neuropathy, hypertension, hyperlipidemia for follow-up visit  He was diagnosed with type 2 diabetes at least 25 years ago and takes insulin and oral agents   He utilizes metformin 500 mg 2 tablets twice a day, Jardiance 10 mg daily, Lantus insulin 18 units daily   He states recently he has not been using any Humalog at all   Would like to know what his A1c is without it  He denies polyuria, polydipsia, polyphagia, or nocturia   He denies blurry vision   He denies numbness or tingling of the feet   He denies chest pain or shortness of breath  States that he remains physically active  Has not made any dietary changes over the last few months      Diabetic complications include diabetic neuropathy  [facility] denies diabetic nephropathy, retinopathy, heart attack, stroke, or claudication      Last diabetic foot exam was February 2022  [facility] did see a podiatrist for diabetic foot care in February 2022  He does see a podiatrist regularly      Last diabetic eye exam was January 2021  At home take Xalatan eye drops        Recently has not been checking his blood sugars, so does not have logs present      He has hyperlipidemia and takes simvastatin 40 mg daily  [facility] denies chest pain or shortness of breath      He has hypertension and takes lisinopril 10 mg daily  [facility] denies headache   He denies stroke-like symptoms  Review of Systems   Constitutional: Negative for activity change, appetite change, fatigue and unexpected weight change  HENT: Negative for trouble swallowing  Eyes: Negative for visual disturbance  Respiratory: Negative for chest tightness and shortness of breath  Cardiovascular: Negative for chest pain, palpitations and leg swelling  Gastrointestinal: Negative for abdominal pain, diarrhea, nausea and vomiting  Endocrine: Negative for cold intolerance, heat intolerance, polydipsia, polyphagia and polyuria  Genitourinary: Negative for frequency  Skin: Negative for rash and wound  Neurological: Negative for dizziness, weakness, light-headedness, numbness and headaches  Psychiatric/Behavioral: Negative for dysphoric mood and sleep disturbance  The patient is not nervous/anxious          Historical Information   Past Medical History:   Diagnosis Date    Depression     Diabetic frozen shoulder associated with type 2 diabetes mellitus (HCC)     bilaterally    Glaucoma     Schizophrenia ([facility] [address] )      Past Surgical History:   Procedure Laterality Date    TOOTH EXTRACTION       Social History   Social History     Substance and Sexual Activity   Alcohol Use No     Social History     Substance and Sexual Activity   Drug Use No     Social History     Tobacco Use   Smoking Status Current Every Day Smoker    Packs/day: 1 00    Types: Cigarettes   Smokeless Tobacco Never Used     Family History:   Family History   Problem Relation Age of Onset    Breast cancer Mother     Diabetes unspecified Father     Hypertension Father     Glaucoma Father     No Known Problems Brother     Diabetes unspecified Paternal Aunt     No Known Problems Brother        Meds/Allergies   Current Outpatient Medications   Medication Sig Dispense Refill    Alcohol Swabs (Ultra-Care Alcohol Prep Pads) 70 % PADS USE FIVE times a  each 5    benztropine (COGENTIN) 1 mg tablet Take 1 mg by mouth 2 (two) times a day as needed Patient unsure of dosage      citalopram (CeleXA) 20 mg tablet Take 20 mg by mouth daily        clonazePAM (KlonoPIN) 0 5 mg tablet 1 mg daily at bedtime       insulin lispro (HumaLOG KwikPen) 100 units/mL injection pen 4 units with breakfast and 4 units with lunch and supper and extra insulin if sugar over 200 5 pen 6    Insulin Pen Needle (Pro Comfort Pen Needles) 32G X 6 MM MISC Use to give insulin 4 times a day 200 each 6    Jardiance 10 MG TABS TAKE ONE TABLET BY MOUTH EVERY MORNING 30 tablet 6    Lantus SoloStar 100 units/mL injection pen Inject 16 Units under the skin daily 15 mL 3    latanoprost (XALATAN) 0 005 % ophthalmic solution Administer 1 drop to both eyes daily at bedtime      lisinopril (ZESTRIL) 10 mg tablet TAKE ONE TABLET BY MOUTH DAILY 90 tablet 6    metFORMIN (GLUCOPHAGE) 500 mg tablet TAKE TWO TABLETS BY MOUTH TWICE DAILY BEOFRE MEALS 360 tablet 6    QUEtiapine (SEROquel) 300 mg tablet Take 300 mg by mouth daily at bedtime      simvastatin (ZOCOR) 40 mg tablet TAKE ONE TABLET BY MOUTH AT BEDTIME DAILY 90 tablet 6    Suvorexant (Belsomra) 10 MG TABS Take by mouth daily at bedtime      True Metrix Blood Glucose Test test strip Use as instructed up to 5 times a day  600 each 2    ziprasidone (GEODON) 80 mg capsule TAKE ONE CAPSULE BY MOUTH WITH BREAKFAST AND TAKE TWO CAPSULES AT DINNER TIME MUST TAKE WITH FOOD       No current facility-administered medications for this visit  No Known Allergies    Objective   Vitals: There were no vitals taken for this visit  Physical Exam  Vitals and nursing note reviewed  Constitutional:       General: He is not in acute distress  Appearance: Normal appearance  He is not diaphoretic  HENT:      Head: Normocephalic and atraumatic  Eyes:      General: No scleral icterus  Extraocular Movements: Extraocular movements intact  Conjunctiva/sclera: Conjunctivae normal       Pupils: Pupils are equal, round, and reactive to light  Cardiovascular:      Rate and Rhythm: Normal rate and regular rhythm  Heart sounds: No murmur heard  Pulmonary:      Effort: Pulmonary effort is normal  No respiratory distress  Breath sounds: Normal breath sounds  No wheezing  Musculoskeletal:      Cervical back: Normal range of motion  Right lower leg: No edema  Left lower leg: No edema  Lymphadenopathy:      Cervical: No cervical adenopathy  Skin:     General: Skin is warm and dry  Neurological:      Mental Status: He is alert and oriented to person, place, and time  Mental status is at baseline  Sensory: No sensory deficit  Gait: Gait normal    Psychiatric:         Mood and Affect: Mood normal          Behavior: Behavior normal          Thought Content: Thought content normal          The history was obtained from the review of the chart, patient      Lab Results:   Lab Results   Component Value Date/Time    Hemoglobin A1C 8 2 (H) 02/07/2022 09:54 AM    Hemoglobin A1C 7 5 (H) 11/12/2021 10:48 AM    Hemoglobin A1C 6 8 (H) 04/06/2021 09:21 AM    BUN 19 02/07/2022 09:54 AM    BUN 17 11/12/2021 10:48 AM    BUN 15 04/06/2021 09:21 AM    Potassium 4 1 02/07/2022 09:54 AM    Potassium 4 4 11/12/2021 10:48 AM    Potassium 4 2 04/06/2021 09:21 AM    Chloride 100 02/07/2022 09:54 AM    Chloride 101 11/12/2021 10:48 AM    Chloride 102 04/06/2021 09:21 AM    CO2 27 02/07/2022 09:54 AM    CO2 25 11/12/2021 10:48 AM    CO2 27 04/06/2021 09:21 AM    Creatinine 0 94 02/07/2022 09:54 AM    Creatinine 1 06 11/12/2021 10:48 AM    Creatinine 0 98 04/06/2021 09:21 AM    AST 12 02/07/2022 09:54 AM    AST 43 (H) 11/12/2021 10:48 AM    AST 47 (H) 04/06/2021 09:21 AM    ALT 15 02/07/2022 09:54 AM    ALT 64 (H) 11/12/2021 10:48 AM    ALT 70 (H) 04/06/2021 09:21 AM    Albumin 4 1 02/07/2022 09:54 AM    Albumin 4 2 11/12/2021 10:48 AM    Albumin 4 4 04/06/2021 09:21 AM    Globulin 2 8 02/07/2022 09:54 AM    Globulin 2 5 11/12/2021 10:48 AM    Globulin 2 1 04/06/2021 09:21 AM    HDL 47 11/12/2021 10:48 AM    HDL 48 04/06/2021 09:21 AM    Triglycerides 149 11/12/2021 10:48 AM    Triglycerides 179 (H) 04/06/2021 09:21 AM         Portions of the record may have been created with voice recognition software  Occasional wrong word or "sound a like" substitutions may have occurred due to the inherent limitations of voice recognition software  Read the chart carefully and recognize, using context, where substitutions have occurred

## 2022-02-24 NOTE — PATIENT INSTRUCTIONS
Continue to monitor diet, and maintain physical activity        Continue with metformin 500 mg 2 tablets twice a day, Jardiance 10 mg daily, Lantus 18 units daily  Restart Humalog 4 units with each meal      To make sure to check blood sugar 2 or more times a day  Please send in blood sugar logs in 2 weeks so we can make adjustments to medication if needed        Continue with cholesterol and blood pressure medication      Follow-up in 3 months with lab work prior to visit

## 2022-03-07 DIAGNOSIS — E11.65 TYPE 2 DIABETES MELLITUS WITH HYPERGLYCEMIA, WITH LONG-TERM CURRENT USE OF INSULIN (HCC): ICD-10-CM

## 2022-03-07 DIAGNOSIS — Z79.4 TYPE 2 DIABETES MELLITUS WITH HYPERGLYCEMIA, WITH LONG-TERM CURRENT USE OF INSULIN (HCC): ICD-10-CM

## 2022-03-08 RX ORDER — ISOPRPOPYL ALCOHOL 70 ML/100ML
SWAB TOPICAL
Qty: 100 EACH | Refills: 5 | Status: SHIPPED | OUTPATIENT
Start: 2022-03-08

## 2022-04-04 DIAGNOSIS — Z79.4 TYPE 2 DIABETES MELLITUS WITH HYPERGLYCEMIA, WITH LONG-TERM CURRENT USE OF INSULIN (HCC): ICD-10-CM

## 2022-04-04 DIAGNOSIS — E11.42 DIABETIC POLYNEUROPATHY ASSOCIATED WITH TYPE 2 DIABETES MELLITUS (HCC): ICD-10-CM

## 2022-04-04 DIAGNOSIS — E11.65 TYPE 2 DIABETES MELLITUS WITH HYPERGLYCEMIA, WITH LONG-TERM CURRENT USE OF INSULIN (HCC): ICD-10-CM

## 2022-04-04 RX ORDER — INSULIN LISPRO 100 [IU]/ML
INJECTION, SOLUTION INTRAVENOUS; SUBCUTANEOUS
Qty: 15 ML | Refills: 1 | Status: SHIPPED | OUTPATIENT
Start: 2022-04-04

## 2022-05-14 LAB
ALBUMIN SERPL-MCNC: 4.2 G/DL (ref 3.6–5.1)
ALBUMIN/CREAT UR: 5 MCG/MG CREAT
ALBUMIN/GLOB SERPL: 1.9 (CALC) (ref 1–2.5)
ALP SERPL-CCNC: 80 U/L (ref 35–144)
ALT SERPL-CCNC: 15 U/L (ref 9–46)
AST SERPL-CCNC: 15 U/L (ref 10–35)
BILIRUB SERPL-MCNC: 0.5 MG/DL (ref 0.2–1.2)
BUN SERPL-MCNC: 19 MG/DL (ref 7–25)
BUN/CREAT SERPL: NORMAL (CALC) (ref 6–22)
CALCIUM SERPL-MCNC: 9.4 MG/DL (ref 8.6–10.3)
CHLORIDE SERPL-SCNC: 104 MMOL/L (ref 98–110)
CO2 SERPL-SCNC: 27 MMOL/L (ref 20–32)
CREAT SERPL-MCNC: 0.86 MG/DL (ref 0.7–1.33)
CREAT UR-MCNC: 64 MG/DL (ref 20–320)
GLOBULIN SER CALC-MCNC: 2.2 G/DL (CALC) (ref 1.9–3.7)
GLUCOSE SERPL-MCNC: 96 MG/DL (ref 65–99)
HBA1C MFR BLD: 7.5 % OF TOTAL HGB
MICROALBUMIN UR-MCNC: 0.3 MG/DL
POTASSIUM SERPL-SCNC: 3.8 MMOL/L (ref 3.5–5.3)
PROT SERPL-MCNC: 6.4 G/DL (ref 6.1–8.1)
SL AMB EGFR AFRICAN AMERICAN: 116 ML/MIN/1.73M2
SL AMB EGFR NON AFRICAN AMERICAN: 100 ML/MIN/1.73M2
SODIUM SERPL-SCNC: 140 MMOL/L (ref 135–146)

## 2022-06-13 ENCOUNTER — OFFICE VISIT (OUTPATIENT)
Dept: ENDOCRINOLOGY | Facility: HOSPITAL | Age: 52
End: 2022-06-13

## 2022-06-13 VITALS
BODY MASS INDEX: 28.31 KG/M2 | DIASTOLIC BLOOD PRESSURE: 62 MMHG | HEART RATE: 88 BPM | HEIGHT: 73 IN | WEIGHT: 213.6 LBS | OXYGEN SATURATION: 96 % | SYSTOLIC BLOOD PRESSURE: 108 MMHG

## 2022-06-13 DIAGNOSIS — Z79.4 TYPE 2 DIABETES MELLITUS WITH HYPERGLYCEMIA, WITH LONG-TERM CURRENT USE OF INSULIN (HCC): Primary | ICD-10-CM

## 2022-06-13 DIAGNOSIS — Z79.4 TYPE 2 DIABETES MELLITUS WITH OTHER SPECIFIED COMPLICATION, WITH LONG-TERM CURRENT USE OF INSULIN (HCC): ICD-10-CM

## 2022-06-13 DIAGNOSIS — E11.65 TYPE 2 DIABETES MELLITUS WITH HYPERGLYCEMIA, WITH LONG-TERM CURRENT USE OF INSULIN (HCC): Primary | ICD-10-CM

## 2022-06-13 DIAGNOSIS — I10 PRIMARY HYPERTENSION: ICD-10-CM

## 2022-06-13 DIAGNOSIS — E11.49 OTHER DIABETIC NEUROLOGICAL COMPLICATION ASSOCIATED WITH TYPE 2 DIABETES MELLITUS (HCC): ICD-10-CM

## 2022-06-13 DIAGNOSIS — E11.69 TYPE 2 DIABETES MELLITUS WITH OTHER SPECIFIED COMPLICATION, WITH LONG-TERM CURRENT USE OF INSULIN (HCC): ICD-10-CM

## 2022-06-13 DIAGNOSIS — E78.2 MIXED HYPERLIPIDEMIA: ICD-10-CM

## 2022-06-13 RX ORDER — CALCIUM CITRATE/VITAMIN D3 200MG-6.25
TABLET ORAL
Qty: 600 EACH | Refills: 2 | Status: SHIPPED | OUTPATIENT
Start: 2022-06-13

## 2022-06-13 RX ORDER — INSULIN GLARGINE 100 [IU]/ML
18 INJECTION, SOLUTION SUBCUTANEOUS DAILY
Qty: 15 ML | Refills: 2 | Status: SHIPPED | OUTPATIENT
Start: 2022-06-13

## 2022-06-13 NOTE — PROGRESS NOTES
Jewell Espino 47 y o  male MRN: [id number]    Encounter: [id number]      Assessment/Plan     Assessment: This is a 47y o -year-old male with type 2 diabetes with neuropathy, hypertension and hyperlipidemia  Plan:  1  Type 2 diabetes:  Most recent hemoglobin A1c has decreased to 7 5, but is still above goal   At this time he will continue with Lantus 18 units daily, Jardiance 10 mg daily, metformin 500 mg 2 tablets twice a day  As far as his concerns with Humalog, I recommend either taking 4 units with his largest meal the day, or decreasing down to 2 or 3 units with each meal to see if we can help keep blood glucose more stable   Send in blood sugar logs in 2 weeks for review see if we can adjust medications   Continue with lifestyle modifications   Follow-up in 3 months with lab work completed prior to visit       2  Diabetic neuropathy:  Symptoms stable   Is up-to-date on diabetic foot exam      3  Hypertension:   Normotensive in the office today  Kady Yang function remains stable with normal electrolytes   Continue with current medications   Repeat CMP prior to next office visit      4  Hyperlipidemia:  Previous lipid panel showed slightly elevated triglycerides, but normal total cholesterol, HDL, LDL  Continue with current medication  Reviewed prior to next office visit  CC:  Type 2 diabetes follow-up    History of Present Illness     HPI:  Hanane Ferns a 52 y o  male with type 2 diabetes, insulin requiring with diabetic neuropathy, hypertension, hyperlipidemia for follow-up visit  He was diagnosed with type 2 diabetes at least 25 years ago and takes insulin and oral agents   He utilizes metformin 500 mg 2 tablets twice a day, Jardiance 10 mg daily, Lantus insulin 18 units daily and Humalog 4 units with each meal   He has been inconsistent with his Humalog dosing as he is concerned with hypoglycemia    He denies polyuria, polydipsia, polyphagia, or nocturia   He denies blurry vision   He denies numbness or tingling of the feet   He denies chest pain or shortness of breath   States that he remains physically active  Overall doing well today without any concerns or questions      Diabetic complications include diabetic neuropathy  [facility] denies diabetic nephropathy, retinopathy, heart attack, stroke, or claudication      Last diabetic foot exam was February 2022  [facility] did see a podiatrist for diabetic foot care in February 2022  He does see a podiatrist regularly      Last diabetic eye exam was June 2022  At home take Xalatan eye drops        No blood sugar logs present at today's office visit  States fasting blood sugars are typically around 120  Will occasionally check later in the day and can vary        He has hyperlipidemia and takes simvastatin 40 mg daily  [facility] denies chest pain or shortness of breath      He has hypertension and takes lisinopril 10 mg daily  [facility] denies headache   He denies stroke-like symptoms  Review of Systems   Constitutional: Negative for chills, fatigue and fever  HENT: Negative for congestion, ear pain, hearing loss, rhinorrhea and sore throat  Eyes: Negative for pain and visual disturbance  Respiratory: Negative for cough, shortness of breath and wheezing  Cardiovascular: Negative for chest pain, palpitations and leg swelling  Gastrointestinal: Negative for abdominal pain, blood in stool, constipation, diarrhea, nausea and vomiting  Endocrine: Negative for cold intolerance, heat intolerance and polyuria  Genitourinary: Negative for difficulty urinating, dysuria, frequency, hematuria and urgency  Musculoskeletal: Negative for arthralgias, joint swelling and myalgias  Skin: Negative for rash and wound  Neurological: Negative for dizziness, syncope, weakness, numbness and headaches  Psychiatric/Behavioral: Negative for dysphoric mood and sleep disturbance  The patient is not nervous/anxious          Historical Information   Past Medical History: Diagnosis Date    Depression     Diabetic frozen shoulder associated with type 2 diabetes mellitus (HCC)     bilaterally    Glaucoma     Schizophrenia ([address] )      Past Surgical History:   Procedure Laterality Date    TOOTH EXTRACTION       Social History   Social History     Substance and Sexual Activity   Alcohol Use No     Social History     Substance and Sexual Activity   Drug Use No     Social History     Tobacco Use   Smoking Status Current Every Day Smoker    Packs/day: 1 00    Types: Cigarettes   Smokeless Tobacco Never Used     Family History:   Family History   Problem Relation Age of Onset    Breast cancer Mother     Diabetes unspecified Father     Hypertension Father     Glaucoma Father     No Known Problems Brother     Diabetes unspecified Paternal Aunt     No Known Problems Brother        Meds/Allergies   Current Outpatient Medications   Medication Sig Dispense Refill    Alcohol Swabs (Ultra-Care Alcohol Prep Pads) 70 % PADS USE FIVE TIMES DAILY 100 each 5    benztropine (COGENTIN) 1 mg tablet Take 1 mg by mouth 2 (two) times a day as needed Patient unsure of dosage      citalopram (CeleXA) 20 mg tablet Take 20 mg by mouth daily        clonazePAM (KlonoPIN) 0 5 mg tablet 2 mg daily at bedtime        Empagliflozin (Jardiance) 10 MG TABS Take 1 tablet (10 mg total) by mouth daily 90 tablet 1    insulin lispro (HumaLOG KwikPen) 100 units/mL injection pen INJECT FOUR UNITS SUBCUTANEOUSLY AT BREAKFAST, FOUR UNITS AT LUNCH, AND FOUR UNITS AT DINNER, AND INJECT EXTRA IF SUGAR OVER 200 15 mL 1    Insulin Pen Needle (Pro Comfort Pen Needles) 32G X 6 MM MISC Use to give insulin 4 times a day 200 each 6    Lantus SoloStar 100 units/mL injection pen Inject 16 Units under the skin daily 15 mL 3    latanoprost (XALATAN) 0 005 % ophthalmic solution Administer 1 drop to both eyes daily at bedtime      lisinopril (ZESTRIL) 10 mg tablet Take 1 tablet (10 mg total) by mouth daily 90 tablet 3    metFORMIN (GLUCOPHAGE) 500 mg tablet Take 2 tablets (1,000 mg total) by mouth 2 (two) times a day with meals 360 tablet 1    QUEtiapine (SEROquel) 300 mg tablet Take 600 mg by mouth daily at bedtime Also taking a 100 mg tablet to make 700 mg total        simvastatin (ZOCOR) 40 mg tablet Take 1 tablet (40 mg total) by mouth daily at bedtime 90 tablet 3    Suvorexant (Belsomra) 10 MG TABS Take by mouth daily at bedtime      True Metrix Blood Glucose Test test strip USE AS DIRECTED UP TO FIVE times a  each 2    ziprasidone (GEODON) 80 mg capsule TAKE ONE CAPSULE BY MOUTH WITH BREAKFAST AND TAKE TWO CAPSULES AT DINNER TIME MUST TAKE WITH FOOD       No current facility-administered medications for this visit  No Known Allergies    Objective   Vitals: There were no vitals taken for this visit  Physical Exam  Vitals and nursing note reviewed  Constitutional:       General: He is not in acute distress  Appearance: Normal appearance  HENT:      Head: Normocephalic and atraumatic  Eyes:      General: No scleral icterus  Pupils: Pupils are equal, round, and reactive to light  Cardiovascular:      Rate and Rhythm: Normal rate and regular rhythm  Heart sounds: No murmur heard  Pulmonary:      Effort: Pulmonary effort is normal  No respiratory distress  Breath sounds: Normal breath sounds  No wheezing  Musculoskeletal:      Right lower leg: No edema  Left lower leg: No edema  Lymphadenopathy:      Cervical: No cervical adenopathy  Skin:     General: Skin is warm and dry  Neurological:      Mental Status: He is alert and oriented to person, place, and time  Sensory: No sensory deficit  Psychiatric:         Mood and Affect: Mood normal          Behavior: Behavior normal          Thought Content: Thought content normal          The history was obtained from the review of the chart, patient      Lab Results:   Lab Results   Component Value Date/Time    Hemoglobin A1C 7 5 (H) 05/13/2022 09:53 AM    Hemoglobin A1C 8 2 (H) 02/07/2022 09:54 AM    Hemoglobin A1C 7 5 (H) 11/12/2021 10:48 AM    BUN 19 05/13/2022 09:53 AM    BUN 19 02/07/2022 09:54 AM    BUN 17 11/12/2021 10:48 AM    Potassium 3 8 05/13/2022 09:53 AM    Potassium 4 1 02/07/2022 09:54 AM    Potassium 4 4 11/12/2021 10:48 AM    Chloride 104 05/13/2022 09:53 AM    Chloride 100 02/07/2022 09:54 AM    Chloride 101 11/12/2021 10:48 AM    CO2 27 05/13/2022 09:53 AM    CO2 27 02/07/2022 09:54 AM    CO2 25 11/12/2021 10:48 AM    Creatinine 0 86 05/13/2022 09:53 AM    Creatinine 0 94 02/07/2022 09:54 AM    Creatinine 1 06 11/12/2021 10:48 AM    AST 15 05/13/2022 09:53 AM    AST 12 02/07/2022 09:54 AM    AST 43 (H) 11/12/2021 10:48 AM    ALT 15 05/13/2022 09:53 AM    ALT 15 02/07/2022 09:54 AM    ALT 64 (H) 11/12/2021 10:48 AM    Albumin 4 2 05/13/2022 09:53 AM    Albumin 4 1 02/07/2022 09:54 AM    Albumin 4 2 11/12/2021 10:48 AM    Globulin 2 2 05/13/2022 09:53 AM    Globulin 2 8 02/07/2022 09:54 AM    Globulin 2 5 11/12/2021 10:48 AM    HDL 47 11/12/2021 10:48 AM    Triglycerides 149 11/12/2021 10:48 AM       Portions of the record may have been created with voice recognition software  Occasional wrong word or "sound a like" substitutions may have occurred due to the inherent limitations of voice recognition software  Read the chart carefully and recognize, using context, where substitutions have occurred

## 2022-06-13 NOTE — PATIENT INSTRUCTIONS
Continue to monitor diet, and maintain physical activity  Continue with metformin 500 mg 2 tablets twice a day, Jardiance 10 mg daily, Lantus 18 units daily  Can take Humalog 4 units with largest meal or decrease to 3 units with each meal  Call if concern with low blood sugars  To make sure to check blood sugar 2 or more times a day  Please send in blood sugar logs in 2 weeks so we can make adjustments to medication if needed  Continue with cholesterol and blood pressure medication  Follow-up in 3 months with lab work prior to visit

## 2022-08-29 DIAGNOSIS — Z79.4 TYPE 2 DIABETES MELLITUS WITH HYPERGLYCEMIA, WITH LONG-TERM CURRENT USE OF INSULIN (HCC): ICD-10-CM

## 2022-08-29 DIAGNOSIS — E11.65 TYPE 2 DIABETES MELLITUS WITH HYPERGLYCEMIA, WITH LONG-TERM CURRENT USE OF INSULIN (HCC): ICD-10-CM

## 2022-08-30 RX ORDER — ISOPRPOPYL ALCOHOL 70 ML/100ML
SWAB TOPICAL
Qty: 100 EACH | Refills: 5 | Status: SHIPPED | OUTPATIENT
Start: 2022-08-30

## 2022-09-09 ENCOUNTER — TELEPHONE (OUTPATIENT)
Dept: PSYCHIATRY | Facility: CLINIC | Age: 52
End: 2022-09-09

## 2022-09-11 ENCOUNTER — TELEPHONE (OUTPATIENT)
Dept: OTHER | Facility: OTHER | Age: 52
End: 2022-09-11

## 2022-09-11 NOTE — TELEPHONE ENCOUNTER
Patient is calling regarding cancelling an appointment      Date/Time: 9/13/22    Patient was rescheduled: YES [] NO [x]    Patient requesting call back to reschedule: YES [x] NO []

## 2022-09-20 DIAGNOSIS — F20.0 SCHIZOPHRENIA, PARANOID (HCC): Primary | ICD-10-CM

## 2022-09-20 NOTE — TELEPHONE ENCOUNTER
Client calling for RF of gabapentin and lexapro to hold over until appt on 9/27   Sending to [facility] for approval

## 2022-09-22 ENCOUNTER — SOCIAL WORK (OUTPATIENT)
Dept: BEHAVIORAL/MENTAL HEALTH CLINIC | Facility: CLINIC | Age: 52
End: 2022-09-22
Payer: MEDICARE

## 2022-09-22 DIAGNOSIS — F20.0 PARANOID SCHIZOPHRENIA (HCC): Primary | ICD-10-CM

## 2022-09-22 PROCEDURE — 90834 PSYTX W PT 45 MINUTES: CPT | Performed by: SOCIAL WORKER

## 2022-09-22 RX ORDER — ESCITALOPRAM OXALATE 10 MG/1
10 TABLET ORAL DAILY
Qty: 8 TABLET | Refills: 0 | Status: SHIPPED | OUTPATIENT
Start: 2022-09-22 | End: 2022-10-06 | Stop reason: SDUPTHER

## 2022-09-22 RX ORDER — GABAPENTIN 100 MG/1
100 CAPSULE ORAL 2 TIMES DAILY PRN
Qty: 16 CAPSULE | Refills: 0 | Status: SHIPPED | OUTPATIENT
Start: 2022-09-22 | End: 2022-10-06 | Stop reason: SDUPTHER

## 2022-09-27 LAB
ALBUMIN SERPL-MCNC: 4.5 G/DL (ref 3.6–5.1)
ALBUMIN/GLOB SERPL: 2 (CALC) (ref 1–2.5)
ALP SERPL-CCNC: 100 U/L (ref 35–144)
ALT SERPL-CCNC: 11 U/L (ref 9–46)
AST SERPL-CCNC: 12 U/L (ref 10–35)
BILIRUB SERPL-MCNC: 0.7 MG/DL (ref 0.2–1.2)
BUN SERPL-MCNC: 13 MG/DL (ref 7–25)
BUN/CREAT SERPL: ABNORMAL (CALC) (ref 6–22)
CALCIUM SERPL-MCNC: 9.6 MG/DL (ref 8.6–10.3)
CHLORIDE SERPL-SCNC: 106 MMOL/L (ref 98–110)
CHOLEST SERPL-MCNC: 181 MG/DL
CHOLEST/HDLC SERPL: 3.7 (CALC)
CO2 SERPL-SCNC: 26 MMOL/L (ref 20–32)
CREAT SERPL-MCNC: 0.88 MG/DL (ref 0.7–1.3)
GFR/BSA.PRED SERPLBLD CYS-BASED-ARV: 104 ML/MIN/1.73M2
GLOBULIN SER CALC-MCNC: 2.3 G/DL (CALC) (ref 1.9–3.7)
GLUCOSE SERPL-MCNC: 146 MG/DL (ref 65–99)
HBA1C MFR BLD: 7.3 % OF TOTAL HGB
HDLC SERPL-MCNC: 49 MG/DL
LDLC SERPL CALC-MCNC: 110 MG/DL (CALC)
NONHDLC SERPL-MCNC: 132 MG/DL (CALC)
POTASSIUM SERPL-SCNC: 4.4 MMOL/L (ref 3.5–5.3)
PROT SERPL-MCNC: 6.8 G/DL (ref 6.1–8.1)
SODIUM SERPL-SCNC: 139 MMOL/L (ref 135–146)
TRIGL SERPL-MCNC: 113 MG/DL

## 2022-10-06 DIAGNOSIS — G47.09 OTHER INSOMNIA: Primary | ICD-10-CM

## 2022-10-06 DIAGNOSIS — F20.0 SCHIZOPHRENIA, PARANOID (HCC): ICD-10-CM

## 2022-10-06 RX ORDER — GABAPENTIN 100 MG/1
100 CAPSULE ORAL 2 TIMES DAILY PRN
Qty: 16 CAPSULE | Refills: 0 | Status: SHIPPED | OUTPATIENT
Start: 2022-10-06 | End: 2022-10-14 | Stop reason: SDUPTHER

## 2022-10-06 RX ORDER — ESCITALOPRAM OXALATE 10 MG/1
10 TABLET ORAL DAILY
Qty: 8 TABLET | Refills: 0 | Status: SHIPPED | OUTPATIENT
Start: 2022-10-06 | End: 2022-10-14 | Stop reason: SDUPTHER

## 2022-10-06 NOTE — TELEPHONE ENCOUNTER
Refills needed for Lexapro and Gabapentin  Next office visit-10/14/22  [facility]  RF request sent to [address] [place] [address]

## 2022-10-13 ENCOUNTER — SOCIAL WORK (OUTPATIENT)
Dept: BEHAVIORAL/MENTAL HEALTH CLINIC | Facility: CLINIC | Age: 52
End: 2022-10-13
Payer: MEDICARE

## 2022-10-13 DIAGNOSIS — F20.0 PARANOID SCHIZOPHRENIA (HCC): Primary | ICD-10-CM

## 2022-10-13 PROCEDURE — 90834 PSYTX W PT 45 MINUTES: CPT | Performed by: SOCIAL WORKER

## 2022-10-13 NOTE — PSYCH
Psychotherapy Provided: Individual Psychotherapy 46 minutes     Length of time in session: 46 minutes, follow up in two weeks    Encounter Diagnosis     ICD-10-CM    1  Paranoid schizophrenia ([address] )  F20 0        Goals addressed in session: Goal 1     Pain:      moderate to severe    8    Current suicide risk : Low     Visit start time: 12:45pm  Visit stop time: 1:31pm     Data: Salvdaor expressed his concerns over the eviction notice from [facility], and denies the reported allegations from his neighbors  He feels that he should leave [facility] anyway since "it is a mental health program and I'd rather get Section 8 and live in town"  He reports not wanting to live with his parents until housing is resolved  He's willing to live at [facility] temporarily  Assessment: [facility] med service  Salvador reports recent med changes as being effective in lowering AH and improving sleep  Reports med compliance and good sleep  Poor understanding of why the eviction has occurred  Possible decrease in executive functioning  Offered to leave to avoid official eviction documentation  Progress: Sadness of mental health history and need for services is the current focal point  Plan: Next session in 2 weeks  Clinical plan focused on Schizophrenia and decrease in executive functioning  Behavioral Health Treatment Plan [facility]: Diagnosis and Treatment Plan explained to Wan Jacob relates understanding diagnosis and is agreeable to Treatment Plan   Yes

## 2022-10-14 ENCOUNTER — OFFICE VISIT (OUTPATIENT)
Dept: PSYCHIATRY | Facility: CLINIC | Age: 52
End: 2022-10-14
Payer: MEDICARE

## 2022-10-14 DIAGNOSIS — F20.0 SCHIZOPHRENIA, PARANOID (HCC): ICD-10-CM

## 2022-10-14 DIAGNOSIS — F41.1 GENERALIZED ANXIETY DISORDER: ICD-10-CM

## 2022-10-14 DIAGNOSIS — F41.1 GAD (GENERALIZED ANXIETY DISORDER): ICD-10-CM

## 2022-10-14 DIAGNOSIS — F20.0 SCHIZOPHRENIA, PARANOID TYPE (HCC): Primary | ICD-10-CM

## 2022-10-14 PROCEDURE — 99214 OFFICE O/P EST MOD 30 MIN: CPT | Performed by: NURSE PRACTITIONER

## 2022-10-14 RX ORDER — GABAPENTIN 100 MG/1
100 CAPSULE ORAL 2 TIMES DAILY PRN
Qty: 60 CAPSULE | Refills: 1 | Status: SHIPPED | OUTPATIENT
Start: 2022-10-14

## 2022-10-14 RX ORDER — ESCITALOPRAM OXALATE 10 MG/1
10 TABLET ORAL DAILY
Qty: 30 TABLET | Refills: 1 | Status: SHIPPED | OUTPATIENT
Start: 2022-10-14

## 2022-10-14 RX ORDER — BENZTROPINE MESYLATE 1 MG/1
1 TABLET ORAL 2 TIMES DAILY PRN
Qty: 60 TABLET | Refills: 1 | Status: SHIPPED | OUTPATIENT
Start: 2022-10-14

## 2022-10-14 RX ORDER — CLONAZEPAM 1 MG/1
2 TABLET ORAL
Qty: 60 TABLET | Refills: 0 | Status: SHIPPED | OUTPATIENT
Start: 2022-10-14

## 2022-10-14 RX ORDER — GABAPENTIN 300 MG/1
300 CAPSULE ORAL
Qty: 30 CAPSULE | Refills: 1 | Status: SHIPPED | OUTPATIENT
Start: 2022-10-14

## 2022-10-14 RX ORDER — QUETIAPINE FUMARATE 400 MG/1
800 TABLET, FILM COATED ORAL
Qty: 60 TABLET | Refills: 1 | Status: SHIPPED | OUTPATIENT
Start: 2022-10-14
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
## 2022-10-17 ENCOUNTER — OFFICE VISIT (OUTPATIENT)
Dept: ENDOCRINOLOGY | Facility: HOSPITAL | Age: 52
End: 2022-10-17
Payer: MEDICARE

## 2022-10-17 VITALS
BODY MASS INDEX: 27.7 KG/M2 | HEIGHT: 73 IN | WEIGHT: 209 LBS | SYSTOLIC BLOOD PRESSURE: 110 MMHG | HEART RATE: 100 BPM | DIASTOLIC BLOOD PRESSURE: 70 MMHG

## 2022-10-17 DIAGNOSIS — E11.65 TYPE 2 DIABETES MELLITUS WITH HYPERGLYCEMIA, WITH LONG-TERM CURRENT USE OF INSULIN (HCC): Primary | ICD-10-CM

## 2022-10-17 DIAGNOSIS — I10 PRIMARY HYPERTENSION: ICD-10-CM

## 2022-10-17 DIAGNOSIS — E78.2 MIXED HYPERLIPIDEMIA: ICD-10-CM

## 2022-10-17 DIAGNOSIS — Z79.4 TYPE 2 DIABETES MELLITUS WITH HYPERGLYCEMIA, WITH LONG-TERM CURRENT USE OF INSULIN (HCC): Primary | ICD-10-CM

## 2022-10-17 PROCEDURE — 99214 OFFICE O/P EST MOD 30 MIN: CPT | Performed by: PHYSICIAN ASSISTANT

## 2022-10-17 NOTE — PROGRESS NOTES
Valentino Riddle 47 y o  male MRN: [id number]    Encounter: [id number]      Assessment/Plan     Assessment: This is a 47y o -year-old male with type 2 diabetes with neuropathy, hypertension and hyperlipidemia  Plan:  1  Type 2 diabetes:  Most recent hemoglobin A1c has decreased to 7 5, but is still above goal   At this time he will continue with Lantus 16 units daily, metformin 500 mg 2 tablets twice a day  Increase Jardiance to 25 mg daily  Continue with sliding scale of Humalog about 4-6 units with each meal   Encouraged him to send in blood sugar logs in 2 weeks for review see if we can adjust medications   Continue with lifestyle modifications  Contact the office with any concerns or questions   Follow-up in 3 months with lab work completed prior to visit       2  Diabetic neuropathy:  Symptoms stable   Is up-to-date on diabetic foot exam      3  Hypertension:   Normotensive in the office today  [facility] function remains stable with normal electrolytes   Continue with current medications   Repeat CMP prior to next office visit      4  Hyperlipidemia:  LDL cholesterol did increase by quite a bit  Has been consistently lower in the past   This time no adjustments to his medications  Encouraged lifestyle changes to help improve LDL cholesterol  Reviewed prior to next office visit  CC:  Type 2 diabetes follow-up    History of Present Illness     HPI:  Dominique November a 52 y o  male with type 2 diabetes, insulin requiring with diabetic neuropathy, hypertension, hyperlipidemia for follow-up visit  He was diagnosed with type 2 diabetes at least 25 years ago and takes insulin and oral agents   He utilizes metformin 500 mg 2 tablets twice a day, Jardiance 10 mg daily, Lantus insulin 16 units daily and Humalog 4 units with each meal   Does use a sliding scale for his Humalog    He denies polyuria, polydipsia, polyphagia, or nocturia   He denies blurry vision   He denies numbness or tingling of the feet   He denies chest pain or shortness of breath   States that he remains physically active  No changes since last office visit, otherwise is doing quite well      Diabetic complications include diabetic neuropathy  [facility] denies diabetic nephropathy, retinopathy, heart attack, stroke, or claudication      He did see a podiatrist for diabetic foot care in February 2022  He does see a podiatrist regularly      Last diabetic eye exam was June 2022  At home take Xalatan eye drops        Does check blood sugars routinely, but did not bring in blood sugar logs into the office today  States that blood sugars are typically in the low 100s when he checks      He has hyperlipidemia and takes simvastatin 40 mg daily  [facility] denies chest pain or shortness of breath      He has hypertension and takes lisinopril 10 mg daily  [facility] denies headache   He denies stroke-like symptoms  Review of Systems   Constitutional: Negative for chills, fatigue and fever  HENT: Negative for congestion, ear pain, hearing loss, rhinorrhea and sore throat  Eyes: Negative for pain and visual disturbance  Respiratory: Negative for cough, shortness of breath and wheezing  Cardiovascular: Negative for chest pain, palpitations and leg swelling  Gastrointestinal: Negative for abdominal pain, blood in stool, constipation, diarrhea, nausea and vomiting  Endocrine: Negative for cold intolerance, heat intolerance and polyuria  Genitourinary: Negative for difficulty urinating, dysuria, frequency, hematuria and urgency  Musculoskeletal: Negative for arthralgias, joint swelling and myalgias  Skin: Negative for rash and wound  Neurological: Negative for dizziness, syncope, weakness, numbness and headaches  Psychiatric/Behavioral: Negative for dysphoric mood and sleep disturbance  The patient is not nervous/anxious          Historical Information   Past Medical History:   Diagnosis Date   • Depression    • Diabetic frozen shoulder associated with type 2 diabetes mellitus ([facility] )     bilaterally   • Glaucoma    • Schizophrenia ([facility] )      Past Surgical History:   Procedure Laterality Date   • TOOTH EXTRACTION       Social History   Social History     Substance and Sexual Activity   Alcohol Use No     Social History     Substance and Sexual Activity   Drug Use No     Social History     Tobacco Use   Smoking Status Current Every Day Smoker   • Packs/day: 1 00   • Types: Cigarettes   Smokeless Tobacco Never Used     Family History:   Family History   Problem Relation Age of Onset   • Breast cancer Mother    • Diabetes unspecified Father    • Hypertension Father    • Glaucoma Father    • No Known Problems Brother    • Diabetes unspecified Paternal Aunt    • No Known Problems Brother        Meds/Allergies   Current Outpatient Medications   Medication Sig Dispense Refill   • Alcohol Swabs (Ultra-Care Alcohol Prep Pads) 70 % PADS USE FIVE TIMES DAILY 100 each 5   • benztropine (COGENTIN) 1 mg tablet Take 1 tablet (1 mg total) by mouth 2 (two) times a day as needed for tremors 60 tablet 1   • clonazePAM (KlonoPIN) 1 mg tablet Take 2 tablets (2 mg total) by mouth daily at bedtime 60 tablet 0   • Empagliflozin 25 MG TABS Take 1 tablet (25 mg total) by mouth every morning 90 tablet 1   • escitalopram (Lexapro) 10 mg tablet Take 1 tablet (10 mg total) by mouth daily 30 tablet 1   • gabapentin (Neurontin) 100 mg capsule Take 1 capsule (100 mg total) by mouth 2 (two) times a day as needed (anxiety) 60 capsule 1   • gabapentin (Neurontin) 300 mg capsule Take 1 capsule (300 mg total) by mouth daily at bedtime Prn insomnia 30 capsule 1   • insulin lispro (HumaLOG KwikPen) 100 units/mL injection pen INJECT FOUR UNITS SUBCUTANEOUSLY AT BREAKFAST, FOUR UNITS AT LUNCH, AND FOUR UNITS AT DINNER, AND INJECT EXTRA IF SUGAR OVER 200 15 mL 1   • Insulin Pen Needle (Pro Comfort Pen Needles) 32G X 6 MM MISC Use to give insulin 4 times a day 200 each 6   • Lantus SoloStar 100 units/mL injection pen Inject 18 Units under the skin daily (Patient taking differently: Inject 16 Units under the skin daily) 15 mL 2   • latanoprost (XALATAN) 0 005 % ophthalmic solution Administer 1 drop to both eyes daily at bedtime     • lisinopril (ZESTRIL) 10 mg tablet Take 1 tablet (10 mg total) by mouth daily 90 tablet 3   • metFORMIN (GLUCOPHAGE) 500 mg tablet Take 2 tablets (1,000 mg total) by mouth 2 (two) times a day with meals 360 tablet 1   • QUEtiapine (SEROquel) 400 MG tablet Take 2 tablets (800 mg total) by mouth daily at bedtime 60 tablet 1   • simvastatin (ZOCOR) 40 mg tablet Take 1 tablet (40 mg total) by mouth daily at bedtime 90 tablet 3   • True Metrix Blood Glucose Test test strip USE AS DIRECTED UP TO FIVE times a  each 2   • clonazePAM (KlonoPIN) 0 5 mg tablet 2 mg daily at bedtime   (Patient not taking: Reported on 10/17/2022)       No current facility-administered medications for this visit  No Known Allergies    Objective   Vitals: Blood pressure 110/70, pulse 100, height 6' 1" (1 854 m), weight 94 8 kg (209 lb)  Physical Exam  Vitals and nursing note reviewed  Constitutional:       General: He is not in acute distress  Appearance: Normal appearance  He is not diaphoretic  HENT:      Head: Normocephalic and atraumatic  Eyes:      General: No scleral icterus  Extraocular Movements: Extraocular movements intact  Conjunctiva/sclera: Conjunctivae normal       Pupils: Pupils are equal, round, and reactive to light  Cardiovascular:      Rate and Rhythm: Normal rate and regular rhythm  Heart sounds: No murmur heard  Pulmonary:      Effort: Pulmonary effort is normal  No respiratory distress  Breath sounds: Normal breath sounds  No wheezing  Musculoskeletal:      Cervical back: Normal range of motion  Right lower leg: No edema  Left lower leg: No edema  Lymphadenopathy:      Cervical: No cervical adenopathy  Skin:     General: Skin is warm and dry  Neurological:      Mental Status: He is alert and oriented to person, place, and time  Mental status is at baseline  Sensory: No sensory deficit  Gait: Gait normal    Psychiatric:         Mood and Affect: Mood normal          Behavior: Behavior normal          Thought Content: Thought content normal          The history was obtained from the review of the chart, patient  Lab Results:   Lab Results   Component Value Date/Time    Hemoglobin A1C 7 3 (H) 09/26/2022 10:18 AM    Hemoglobin A1C 7 5 (H) 05/13/2022 09:53 AM    Hemoglobin A1C 8 2 (H) 02/07/2022 09:54 AM    BUN 13 09/26/2022 10:18 AM    BUN 19 05/13/2022 09:53 AM    BUN 19 02/07/2022 09:54 AM    Potassium 4 4 09/26/2022 10:18 AM    Potassium 3 8 05/13/2022 09:53 AM    Potassium 4 1 02/07/2022 09:54 AM    Chloride 106 09/26/2022 10:18 AM    Chloride 104 05/13/2022 09:53 AM    Chloride 100 02/07/2022 09:54 AM    CO2 26 09/26/2022 10:18 AM    CO2 27 05/13/2022 09:53 AM    CO2 27 02/07/2022 09:54 AM    Creatinine 0 88 09/26/2022 10:18 AM    Creatinine 0 86 05/13/2022 09:53 AM    Creatinine 0 94 02/07/2022 09:54 AM    AST 12 09/26/2022 10:18 AM    AST 15 05/13/2022 09:53 AM    AST 12 02/07/2022 09:54 AM    ALT 11 09/26/2022 10:18 AM    ALT 15 05/13/2022 09:53 AM    ALT 15 02/07/2022 09:54 AM    Albumin 4 5 09/26/2022 10:18 AM    Albumin 4 2 05/13/2022 09:53 AM    Albumin 4 1 02/07/2022 09:54 AM    Globulin 2 3 09/26/2022 10:18 AM    Globulin 2 2 05/13/2022 09:53 AM    Globulin 2 8 02/07/2022 09:54 AM    HDL 49 09/26/2022 10:18 AM    HDL 47 11/12/2021 10:48 AM    Triglycerides 113 09/26/2022 10:18 AM    Triglycerides 149 11/12/2021 10:48 AM       Portions of the record may have been created with voice recognition software  Occasional wrong word or "sound a like" substitutions may have occurred due to the inherent limitations of voice recognition software  Read the chart carefully and recognize, using context, where substitutions have occurred

## 2022-10-17 NOTE — PATIENT INSTRUCTIONS
Continue to monitor diet, and maintain physical activity  Continue with metformin 500 mg 2 tablets twice a day, Lantus 16 units daily  Increase Jardiance to 25 mg daily  Continue same dose of Humalog  To make sure to check blood sugar 2 or more times a day  Please send in blood sugar logs in 2 weeks so we can make adjustments to medication if needed  Continue with cholesterol and blood pressure medication  Follow-up in 3 months with lab work prior to visit

## 2022-11-08 ENCOUNTER — SOCIAL WORK (OUTPATIENT)
Dept: BEHAVIORAL/MENTAL HEALTH CLINIC | Facility: CLINIC | Age: 52
End: 2022-11-08

## 2022-11-08 DIAGNOSIS — F20.0 PARANOID SCHIZOPHRENIA (HCC): Primary | ICD-10-CM

## 2022-11-08 NOTE — PSYCH
Psychotherapy Provided: Individual Psychotherapy 30 minutes     Length of time in session: 30 minutes, follow up in one month    Encounter Diagnosis     ICD-10-CM    1  Paranoid schizophrenia ([address] )  F20 0        Goals addressed in session: Goal 1     Pain:      moderate to severe    4    Current suicide risk : Low     Data: Yulia Silverman shared about appreciating the supports from his parents and [employer] case management in applying for [employer] and for the near future getting a room at [facility] in [facility]  Discussed possible peer challenges at [facility]  Yulia Silverman presented well despite the housing issue  Assessment: [employer] med service  No risks assessed  Reports good sleep and presented well  No paranoia  Progress: Resolving housing issue is the current priority, and apparent good advances  Action phase  Plan: Next session in 4 weeks  Clinical plan focused on monitoring mental health  Behavioral Health Treatment Plan [facility]: Diagnosis and Treatment Plan explained to George Cavazos relates understanding diagnosis and is agreeable to Treatment Plan   Yes     Visit start and stop times:    11/08/22  Start Time: 1800  Stop Time: 1830  Total Visit Time: 30 minutes

## 2022-11-11 ENCOUNTER — OFFICE VISIT (OUTPATIENT)
Dept: PSYCHIATRY | Facility: CLINIC | Age: 52
End: 2022-11-11

## 2022-11-11 DIAGNOSIS — F51.05 INSOMNIA DUE TO MENTAL CONDITION: ICD-10-CM

## 2022-11-11 DIAGNOSIS — F20.0 SCHIZOPHRENIA, PARANOID TYPE (HCC): ICD-10-CM

## 2022-11-11 DIAGNOSIS — R68.2 DRY MOUTH: ICD-10-CM

## 2022-11-11 DIAGNOSIS — F41.1 GAD (GENERALIZED ANXIETY DISORDER): ICD-10-CM

## 2022-11-11 RX ORDER — BENZTROPINE MESYLATE 1 MG/1
1 TABLET ORAL 2 TIMES DAILY PRN
Qty: 60 TABLET | Refills: 1 | Status: SHIPPED | OUTPATIENT
Start: 2022-11-11

## 2022-11-11 RX ORDER — QUETIAPINE FUMARATE 400 MG/1
800 TABLET, FILM COATED ORAL
Qty: 60 TABLET | Refills: 1 | Status: SHIPPED | OUTPATIENT
Start: 2022-11-11

## 2022-11-11 RX ORDER — GABAPENTIN 100 MG/1
100 CAPSULE ORAL 2 TIMES DAILY PRN
Qty: 60 CAPSULE | Refills: 1 | Status: SHIPPED | OUTPATIENT
Start: 2022-11-11

## 2022-11-11 RX ORDER — GABAPENTIN 600 MG/1
600 TABLET ORAL
Qty: 30 TABLET | Refills: 1 | Status: SHIPPED | OUTPATIENT
Start: 2022-11-11

## 2022-11-11 RX ORDER — ESCITALOPRAM OXALATE 10 MG/1
10 TABLET ORAL DAILY
Qty: 30 TABLET | Refills: 1 | Status: SHIPPED | OUTPATIENT
Start: 2022-11-11

## 2022-11-11 RX ORDER — CLONAZEPAM 1 MG/1
2 TABLET ORAL
Qty: 60 TABLET | Refills: 0 | Status: SHIPPED | OUTPATIENT
Start: 2022-11-11
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
## 2022-12-07 ENCOUNTER — TELEPHONE (OUTPATIENT)
Dept: BEHAVIORAL/MENTAL HEALTH CLINIC | Facility: CLINIC | Age: 52
End: 2022-12-07

## 2022-12-07 NOTE — TELEPHONE ENCOUNTER
No -show for today's scheduled therapy session  Client is dealing with housing issues related to having to move out of [facility]  Therapist left message telling client to please reschedule 
Sensation intact in upper and lower extremities

## 2022-12-15 DIAGNOSIS — F51.05 INSOMNIA DUE TO MENTAL CONDITION: ICD-10-CM

## 2022-12-15 DIAGNOSIS — Z79.4 TYPE 2 DIABETES MELLITUS WITH HYPERGLYCEMIA, WITH LONG-TERM CURRENT USE OF INSULIN (HCC): ICD-10-CM

## 2022-12-15 DIAGNOSIS — E11.65 TYPE 2 DIABETES MELLITUS WITH HYPERGLYCEMIA, WITH LONG-TERM CURRENT USE OF INSULIN (HCC): ICD-10-CM

## 2022-12-15 DIAGNOSIS — F20.0 SCHIZOPHRENIA, PARANOID TYPE (HCC): ICD-10-CM

## 2022-12-15 DIAGNOSIS — R68.2 DRY MOUTH: ICD-10-CM

## 2022-12-15 RX ORDER — ESCITALOPRAM OXALATE 10 MG/1
10 TABLET ORAL DAILY
Qty: 30 TABLET | Refills: 1 | Status: CANCELLED | OUTPATIENT
Start: 2022-12-15

## 2022-12-15 RX ORDER — QUETIAPINE FUMARATE 400 MG/1
800 TABLET, FILM COATED ORAL
Qty: 60 TABLET | Refills: 1 | Status: CANCELLED | OUTPATIENT
Start: 2022-12-15

## 2022-12-15 NOTE — TELEPHONE ENCOUNTER
Medication Refill Request     Name of Medication Klonopin  Dose/Frequency 2mg tab at night  Quantity 60 tabs  Verified pharmacy   [x]  Verified ordering Provider   [x]  Does patient have enough for the next 3 days? Yes [x] No []  Does patient have a follow-up appointment scheduled? Yes [x] No []   If so when is appointment:   Last seen 11/11 12/9 - 4wk f/u appt cancelled  R/s appt for Antidot  -------------------------------------------------    Medication Refill Request     Name of Medication Empagliflozin  Dose/Frequency 25mg tabs in AM  Quantity 90 tabs  Verified pharmacy   [x]  Verified ordering Provider   [x]  Does patient have enough for the next 3 days? Yes [x] No []  Does patient have a follow-up appointment scheduled? Yes [x] No []   If so when is appointment:   Last seen 11/11 12/9 - 4wCalendlyu appt cancelled  R/s appt for Antidot  -------------------------------------------------    Medication Refill Request     Name of Medication Lexapro  Dose/Frequency 10mg 1x a day  Quantity 30 tabs  Verified pharmacy   [x]  Verified ordering Provider   [x]  Does patient have enough for the next 3 days? Yes [x] No []  Does patient have a follow-up appointment scheduled? Yes [x] No []   If so when is appointment:   Last seen 11/11 12/9 - 4wk f/u appt cancelled  R/s appt for Antidot  -------------------------------------------------    Medication Refill Request     Name of Medication Neurontin   Dose/Frequency 100mg 2x a day as needed  Quantity 60 caps  Verified pharmacy   [x]  Verified ordering Provider   [x]  Does patient have enough for the next 3 days? Yes [x] No []  Does patient have a follow-up appointment scheduled?  Yes [x] No []   If so when is appointment:   Last seen 11/11 12/9 - 4wk f/u appt cancelled  R/S appt for [email or web address]  -------------------------------------------------

## 2022-12-15 NOTE — TELEPHONE ENCOUNTER
Last seen 11/11, 4wk f/u appt 12/9 - cancelled  Patient called to R/S 12/9 appt and Rx Refill  New appt:  [email or web address]    RxRefill encounter inputted into patient chart

## 2022-12-16 DIAGNOSIS — F20.0 SCHIZOPHRENIA, PARANOID TYPE (HCC): ICD-10-CM

## 2022-12-16 DIAGNOSIS — R68.2 DRY MOUTH: ICD-10-CM

## 2022-12-16 RX ORDER — QUETIAPINE FUMARATE 400 MG/1
800 TABLET, FILM COATED ORAL
Qty: 60 TABLET | Refills: 1 | Status: SHIPPED | OUTPATIENT
Start: 2022-12-16 | End: 2022-12-20 | Stop reason: SDUPTHER

## 2022-12-16 RX ORDER — CLONAZEPAM 1 MG/1
2 TABLET ORAL
Qty: 60 TABLET | Refills: 0 | OUTPATIENT
Start: 2022-12-16

## 2022-12-16 RX ORDER — ESCITALOPRAM OXALATE 10 MG/1
10 TABLET ORAL DAILY
Qty: 30 TABLET | Refills: 1 | Status: SHIPPED | OUTPATIENT
Start: 2022-12-16 | End: 2022-12-20 | Stop reason: SDUPTHER

## 2022-12-16 RX ORDER — GABAPENTIN 100 MG/1
100 CAPSULE ORAL 2 TIMES DAILY PRN
Qty: 60 CAPSULE | Refills: 1 | Status: SHIPPED | OUTPATIENT
Start: 2022-12-16 | End: 2022-12-20 | Stop reason: SDUPTHER

## 2022-12-16 RX ORDER — GABAPENTIN 100 MG/1
100 CAPSULE ORAL 2 TIMES DAILY PRN
Qty: 60 CAPSULE | Refills: 1 | OUTPATIENT
Start: 2022-12-16

## 2022-12-18 ENCOUNTER — TELEPHONE (OUTPATIENT)
Dept: OTHER | Facility: OTHER | Age: 52
End: 2022-12-18

## 2022-12-19 ENCOUNTER — TELEPHONE (OUTPATIENT)
Dept: PSYCHIATRY | Facility: CLINIC | Age: 52
End: 2022-12-19

## 2022-12-19 NOTE — TELEPHONE ENCOUNTER
Patient LVM needing to cancel an appt  -- not sure which appt  There are two upcoming appts with different providers         Returned call and [email or web address]

## 2022-12-20 ENCOUNTER — SOCIAL WORK (OUTPATIENT)
Dept: BEHAVIORAL/MENTAL HEALTH CLINIC | Facility: CLINIC | Age: 52
End: 2022-12-20

## 2022-12-20 ENCOUNTER — OFFICE VISIT (OUTPATIENT)
Dept: PSYCHIATRY | Facility: CLINIC | Age: 52
End: 2022-12-20

## 2022-12-20 DIAGNOSIS — F20.0 SCHIZOPHRENIA, PARANOID TYPE (HCC): Primary | ICD-10-CM

## 2022-12-20 DIAGNOSIS — F40.10 SOCIAL ANXIETY DISORDER: ICD-10-CM

## 2022-12-20 DIAGNOSIS — F41.1 GAD (GENERALIZED ANXIETY DISORDER): ICD-10-CM

## 2022-12-20 DIAGNOSIS — F51.05 INSOMNIA DUE TO MENTAL CONDITION: ICD-10-CM

## 2022-12-20 DIAGNOSIS — F99 INSOMNIA DUE TO OTHER MENTAL DISORDER: ICD-10-CM

## 2022-12-20 DIAGNOSIS — R68.2 DRY MOUTH: ICD-10-CM

## 2022-12-20 DIAGNOSIS — F20.0 PARANOID SCHIZOPHRENIA (HCC): Primary | ICD-10-CM

## 2022-12-20 DIAGNOSIS — F51.05 INSOMNIA DUE TO OTHER MENTAL DISORDER: ICD-10-CM

## 2022-12-20 RX ORDER — CLONAZEPAM 1 MG/1
2 TABLET ORAL
Qty: 60 TABLET | Refills: 1 | Status: SHIPPED | OUTPATIENT
Start: 2023-01-07

## 2022-12-20 RX ORDER — GABAPENTIN 600 MG/1
600 TABLET ORAL
Qty: 30 TABLET | Refills: 1 | Status: SHIPPED | OUTPATIENT
Start: 2022-12-20

## 2022-12-20 RX ORDER — GABAPENTIN 100 MG/1
100 CAPSULE ORAL 2 TIMES DAILY PRN
Qty: 60 CAPSULE | Refills: 1 | Status: SHIPPED | OUTPATIENT
Start: 2022-12-20

## 2022-12-20 RX ORDER — ESCITALOPRAM OXALATE 10 MG/1
10 TABLET ORAL DAILY
Qty: 30 TABLET | Refills: 1 | Status: SHIPPED | OUTPATIENT
Start: 2022-12-20

## 2022-12-20 RX ORDER — BENZTROPINE MESYLATE 1 MG/1
1 TABLET ORAL 2 TIMES DAILY PRN
Qty: 60 TABLET | Refills: 1 | Status: SHIPPED | OUTPATIENT
Start: 2022-12-20

## 2022-12-20 RX ORDER — QUETIAPINE FUMARATE 400 MG/1
800 TABLET, FILM COATED ORAL
Qty: 60 TABLET | Refills: 1 | Status: SHIPPED | OUTPATIENT
Start: 2022-12-20

## 2022-12-20 NOTE — BH TREATMENT PLAN
Reba Vidya  1970       Date of Initial Treatment Plan: 4 years ago   Date of Current Treatment Plan: 12/20/22    Treatment Plan Number - uncertain     Strengths/Personal Resources for Self Care: good support from parents    Diagnosis:   1  Paranoid schizophrenia ([address] )        2  VANGIE (generalized anxiety disorder)        3  Insomnia due to other mental disorder            Area of Needs: Adequate sleep to prevent psychosis      Long Term Goal 1: A- Maintain stable mental health    Target Date: N/A  Completion Date: N/A         Short Term Objectives for Goal 1: A- Monitor and maintain adequate sleep, min  8 hours            B- Monitor AH and paranoia associated with poor sleep           C- Adjustment to new housing situation           D- Continue good relations and support with parents      GOAL 1: Modality: Individual 1x per month   Completion Date - ongoing medication service        [address]: Diagnosis and Treatment Plan explained to Esteban Tom relates understanding diagnosis and is agreeable to Treatment Plan            Client Comments : Please share your thoughts, feelings, need and/or experiences regarding your treatment plan:

## 2022-12-20 NOTE — PSYCH
Psychotherapy Provided: Individual Psychotherapy 45 minutes     Length of time in session: 45 minutes, follow up in two weeks    Encounter Diagnosis     ICD-10-CM    1  Paranoid schizophrenia ([address] )  F20 0       2  VANGIE (generalized anxiety disorder)  F41 1       3  Insomnia due to other mental disorder  F51 05     F99           Goals addressed in session: Goal 1     Pain:      moderate to severe    4    Current suicide risk : Low     Data: Mendez (49) presented well aeb no psychosis, good understanding of his move to new housing at [facility]  Reports good sleep  Sumi Gandhi talked about [place] grounded in facts  Looks forward to new housing for a fresh start  Does not want case management oversight  Good supports from parents with the move  Assessment: [facility] med service  No risks assessed  Good sleep, no psychosis observed in session  Progress: Did new treatment plan as identified in this document  Action phase  Plan: Next session in 2 weeks after move to new apartment  Clinical plan focused on monitoring mental health  Behavioral Health Treatment Plan [facility]: Diagnosis and Treatment Plan explained to Esteban Tom relates understanding diagnosis and is agreeable to Treatment Plan   Yes     Visit start and stop times:    12/20/22  Start Time: 1050  Stop Time: 1135  Total Visit Time: 45 minutes

## 2022-12-20 NOTE — PSYCH
Psychiatric Medication Management - [address] [place] 47 y o  male MRN: [id number]        This note was not shared with the patient due to reasonable likelihood of causing patient harm    Reason for Visit: insomnia and psychosis    Subjective: Pt observed on Seroquel to 800 mg hs, Gabapentin to 600 mg hs, Cogentin 1 mg bid, Lexapro 10 mg am, Clonazepam 2 mg prn hs,  and Gabapentin 100 mg bid prn for anxiety  Pt reports dry mouth as the only se  Talked about drinking plenty of water  No evidence of EPS/TD/ sedation  Pt reports less intense anxiety symptoms  Anxiety comes and goes and exacerbated by social situations  Pt denies depression symptoms  Sleep has improved with recent Gabapentin increase  Energy and motivation have both improved  Enjoys drinking coffee and smoking cigarettes  He denies SI/HI  Pt reports social anxiety  He reports  AH: " conversations " Denies command AH to harm self or others  Denies VH  Remains paranoid that he is being watched  He will continue current medications: Seroquel to 800 mg hs and increase Gabapentin to 600 mg mg hs prn to improve insomnia  Pt will continue Cogentin 1 mg bid, Lexapro 10 mg am and Gabapentin 100 mg bid prn for anxiety  Reviewed medication se, properties and scheduling with pt      Review Of Systems:     Constitutional glaucoma   ENT Negative   Cardiovascular Hyperlipidemia- on Zocor, HTN   Respiratory Negative   Gastrointestinal Negative   Genitourinary Negative   Musculoskeletal Negative   Integumentary Negative   Neurological Negative   Endocrine IDDM       Allergies: No Known Allergies    Past Psychiatric History: Eldon Ritchie at [facility]    Family Psychiatric History: pt not sure    Social History: single, no children, lives by self    Substance Abuse History: " I drink a little bit"    Traumatic History: emotional abuse    The following portions of the patient's history were reviewed and updated as appropriate: past family history, past medical history, past social history, past surgical history and problem list       Mental status:  Appearance poor eye contact , poor hygiene /disheveled   Mood calm   Affect Appears flat   Speech Increased latency of response   Thought Processes slow   Associations loose associations   Hallucinations Auditory Hallucinations   Thought Content No passive or active suicidal or homicidal ideation, intent, or plan  Paranoid beliefs   Orientation Oriented to person, place, time, and situation   Recent and Remote Memory Mildly impaired   Attention Span and Concentration Concentration impaired   Intellect Impaired Abstract Thinking   Insight Limited insight   Judgement judgment was impaired   Muscle Strength Normal gait    Language Basic words   Fund of Knowledge Below average for age   Pain NA     ? Assessment/Plan: pt is calm, paranoid beliefs, insomnia/ continue Seroquel to 800 mg hs, Gabapentin to 600 mg hs prn, Cogentin 1 mg bid, Lexapro 10 mg am, Clonazepam 2 mg prn hs and Gabapentin 100 mg bid prn for anxiety  Discussed dry mouth as a side effect and pt encouraged to drink plenty of water  Diagnosis: Schizophrenia, paranoid, VANGIE, Social Anxiety    Risks, Benefits And Possible Side Effects Of Medications:  Risks, benefits, and possible side effects of medications explained to patient and family, they verbalize understanding    Controlled Medication Discussion: Discussed with patient Black Box warning on concurrent use of benzodiazepines and opioid medications including sedation, respiratory depression, coma and death  Patient understands the risk of treatment with benzodiazepines in addition to opioids and wants to continue taking those medications  Psychotherapy Provided: Supportive psychotherapy provided  Yes  Medication changes discussed with Taina Vaca        Visit Time    Visit Start Time: 13:40  Visit Stop Time:  13: 53  Total Visit Duration: 13 min

## 2023-01-03 ENCOUNTER — SOCIAL WORK (OUTPATIENT)
Dept: BEHAVIORAL/MENTAL HEALTH CLINIC | Facility: CLINIC | Age: 53
End: 2023-01-03

## 2023-01-03 DIAGNOSIS — F41.1 GAD (GENERALIZED ANXIETY DISORDER): ICD-10-CM

## 2023-01-03 DIAGNOSIS — F40.10 SOCIAL ANXIETY DISORDER: ICD-10-CM

## 2023-01-03 DIAGNOSIS — F51.05 INSOMNIA DUE TO OTHER MENTAL DISORDER: ICD-10-CM

## 2023-01-03 DIAGNOSIS — F99 INSOMNIA DUE TO OTHER MENTAL DISORDER: ICD-10-CM

## 2023-01-03 DIAGNOSIS — F20.0 PARANOID SCHIZOPHRENIA (HCC): Primary | ICD-10-CM

## 2023-01-03 NOTE — PSYCH
Psychotherapy Provided: Individual Psychotherapy 30 minutes     Length of time in session: 30 minutes, follow up in one month    Encounter Diagnosis     ICD-10-CM    1  Paranoid schizophrenia ([address] )  F20 0       2  VANGIE (generalized anxiety disorder)  F41 1       3  Insomnia due to other mental disorder  F51 05     F99       4  Social anxiety disorder  F40 10           Goals addressed in session: Goal 1     Pain:      moderate to severe    8    Current suicide risk : Low     Data: Gordy Toribio shared that his parents did not want him to move into the [address] [place] [address] Ave, and instead he is staying at [employer]  His parents are paying for this, reportedly  Gordy Toribio initiated a lot of strange conversation topics, which may be indicating delusional thoughts  He insists that he is sleeping well, and is taking his medication as prescribed  Therapist questioned whether this is true  Gordy Toribio presented as very friendly; and has been working with this therapist for over 4 years  Assessment: [facility] med service  No risks assessed  Based upon history, assessment of poor sleep and med non-compliance  Gordy Toribio is going through a major adjustment right now, and this may be contributing to how he presents - example, not sleeping well at the motel  No mention of AH  Progress: Parents are being very supportive with housing  Relapse with sleep and psychosis  Action phase  Plan: Gordy Toribio does not want to meet for another month  Clinical plan focused on mental health stability  Behavioral Health Treatment Plan [facility]: Diagnosis and Treatment Plan explained to Wan Jacob relates understanding diagnosis and is agreeable to Treatment Plan   Yes     Visit start and stop times:    01/03/23  Start Time: 1400  Stop Time: 1430  Total Visit Time: 30 minutes

## 2023-01-24 LAB
ALBUMIN SERPL-MCNC: 4.4 G/DL (ref 3.6–5.1)
ALBUMIN/GLOB SERPL: 2.1 (CALC) (ref 1–2.5)
ALP SERPL-CCNC: 87 U/L (ref 35–144)
ALT SERPL-CCNC: 10 U/L (ref 9–46)
AST SERPL-CCNC: 12 U/L (ref 10–35)
BILIRUB SERPL-MCNC: 0.4 MG/DL (ref 0.2–1.2)
BUN SERPL-MCNC: 10 MG/DL (ref 7–25)
BUN/CREAT SERPL: ABNORMAL (CALC) (ref 6–22)
CALCIUM SERPL-MCNC: 9.5 MG/DL (ref 8.6–10.3)
CHLORIDE SERPL-SCNC: 103 MMOL/L (ref 98–110)
CO2 SERPL-SCNC: 27 MMOL/L (ref 20–32)
CREAT SERPL-MCNC: 0.93 MG/DL (ref 0.7–1.3)
GFR/BSA.PRED SERPLBLD CYS-BASED-ARV: 99 ML/MIN/1.73M2
GLOBULIN SER CALC-MCNC: 2.1 G/DL (CALC) (ref 1.9–3.7)
GLUCOSE SERPL-MCNC: 130 MG/DL (ref 65–99)
HBA1C MFR BLD: 7.4 % OF TOTAL HGB
POTASSIUM SERPL-SCNC: 4.4 MMOL/L (ref 3.5–5.3)
PROT SERPL-MCNC: 6.5 G/DL (ref 6.1–8.1)
SODIUM SERPL-SCNC: 139 MMOL/L (ref 135–146)
TSH SERPL-ACNC: 1.64 MIU/L (ref 0.4–4.5)

## 2023-01-31 ENCOUNTER — SOCIAL WORK (OUTPATIENT)
Dept: BEHAVIORAL/MENTAL HEALTH CLINIC | Facility: CLINIC | Age: 53
End: 2023-01-31

## 2023-01-31 DIAGNOSIS — F99 INSOMNIA DUE TO OTHER MENTAL DISORDER: ICD-10-CM

## 2023-01-31 DIAGNOSIS — F20.0 PARANOID SCHIZOPHRENIA (HCC): Primary | ICD-10-CM

## 2023-01-31 DIAGNOSIS — F51.05 INSOMNIA DUE TO OTHER MENTAL DISORDER: ICD-10-CM

## 2023-01-31 DIAGNOSIS — F41.1 GAD (GENERALIZED ANXIETY DISORDER): ICD-10-CM

## 2023-01-31 DIAGNOSIS — F40.10 SOCIAL ANXIETY DISORDER: ICD-10-CM

## 2023-01-31 NOTE — PSYCH
Behavioral Health Psychotherapy Progress Note    Psychotherapy Provided: Individual Psychotherapy     1  Paranoid schizophrenia ([address] )        2  VANGIE (generalized anxiety disorder)        3  Insomnia due to other mental disorder        4  Social anxiety disorder            Goals addressed in session: Goal 1     DATA: Vlad Marie reports liking his housing at the [facility] - more peaceful, less stress  He will stay there, with his parents helping with rent, until he can get [address] with AC assistance  Reports 6-8 hours sleep, and presents with good mental health  Very conversational  Therapist informed him that he is retiring 2/16 and that he will do a referral for a new therapist and/or a CPS for mental health monitoring  Discussed what would make his life more enjoyable, for his consideration  During this session, this clinician used the following therapeutic modalities: Motivational Interviewing    Substance Abuse was not addressed during this session  If the client is diagnosed with a co-occurring substance use disorder, please indicate any changes in the frequency or amount of use: NA  Stage of change for addressing substance use diagnoses: No substance use/Not applicable    ASSESSMENT:  Surjit Cook presents with a Euthymic/ normal mood  his affect is Normal range and intensity, which is congruent, with his mood and the content of the session  The client has made progress on their goals  [facility] med service  No risks assessed  Current stable mental health and good sleep  Surjit Cook presents with a none risk of suicide, none risk of self-harm, and none risk of harm to others  For any risk assessment that surpasses a "low" rating, a safety plan must be developed  A safety plan was indicated: no  If yes, describe in detail NA    Progress: Treatment plan goals: good sleep and only mild AH (current achievement), adjustment to new housing, good relations with parents   Current maintenance phase      PLAN: Between sessions, Niki Harris will maintain good sleep  Behavioral Health Treatment Plan and Discharge Planning: Niki Harris is aware of and agrees to continue to work on their treatment plan  They have identified and are working toward their discharge goals   yes    Visit start and stop times:    01/31/23  Start Time: 1400  Stop Time: 1445  Total Visit Time: 45 minutes

## 2023-02-01 ENCOUNTER — DOCUMENTATION (OUTPATIENT)
Dept: BEHAVIORAL/MENTAL HEALTH CLINIC | Facility: CLINIC | Age: 53
End: 2023-02-01

## 2023-02-01 ENCOUNTER — OFFICE VISIT (OUTPATIENT)
Dept: ENDOCRINOLOGY | Facility: HOSPITAL | Age: 53
End: 2023-02-01

## 2023-02-01 VITALS
DIASTOLIC BLOOD PRESSURE: 70 MMHG | HEIGHT: 73 IN | BODY MASS INDEX: 27.43 KG/M2 | WEIGHT: 207 LBS | SYSTOLIC BLOOD PRESSURE: 112 MMHG

## 2023-02-01 DIAGNOSIS — Z79.4 TYPE 2 DIABETES MELLITUS WITH HYPERGLYCEMIA, WITH LONG-TERM CURRENT USE OF INSULIN (HCC): Primary | ICD-10-CM

## 2023-02-01 DIAGNOSIS — E11.65 TYPE 2 DIABETES MELLITUS WITH HYPERGLYCEMIA, WITH LONG-TERM CURRENT USE OF INSULIN (HCC): Primary | ICD-10-CM

## 2023-02-01 NOTE — PROGRESS NOTES
Karina Ponce 47 y o  male MRN: [id number]    Encounter: [id number]      Assessment/Plan     Assessment: This is a 47y o -year-old male with type 2 diabetes with neuropathy, hypertension and hyperlipidemia  Plan:  1  Type 2 diabetes: Recent hemoglobin A1c was 7 4  He has been inconsistent with his Humalog, so we will hold off on making adjustments to that medication at this time  He will continue with metformin 500 mg 2 tablets twice a day and Jardiance 25 mg daily  Increase his Lantus to 18 units daily  Encouraged him to check blood sugar 2 or more times a day at alternating times so I get a better idea of what his blood sugars are doing throughout the day and can make adjustments to his Humalog if needed  Contact the office with any concerns or questions  Follow-up in 3 months with lab work completed prior to visit      2  Diabetic neuropathy:  Symptoms stable   Is up-to-date on diabetic foot exam      3  Hypertension:   Normotensive in the office today  Blanca Diaz function remains stable with normal electrolytes   Continue with current medications   Repeat CMP prior to next office visit      4  Hyperlipidemia:  LDL cholesterol did increase by quite a bit  Has been consistently lower in the past   This time no adjustments to his medications  Encouraged lifestyle changes to help improve LDL cholesterol  Repeat lab work prior to next office visit  CC: Type 2 diabetes follow-up    History of Present Illness     HPI:  Kam Stratton a 52 y o  male with type 2 diabetes, insulin requiring with diabetic neuropathy, hypertension, hyperlipidemia for follow-up visit  He was diagnosed with type 2 diabetes at least 25 years ago and takes insulin and oral agents   He utilizes metformin 500 mg 2 tablets twice a day, Jardiance 25 mg daily, Lantus insulin 16 units daily  He has been prescribed Humalog 4 units daily, but states that does not take it on a regular basis due to concerns of hypoglycemia    He denies polyuria, polydipsia, polyphagia, or nocturia   He denies blurry vision   He denies numbness or tingling of the feet   He denies chest pain or shortness of breath   States that he remains physically active  No changes since last office visit, otherwise is doing quite well  Diabetic complications include diabetic neuropathy   He denies diabetic nephropathy, retinopathy, heart attack, stroke, or claudication      He did see a podiatrist for diabetic foot care in February 2022  He does see a podiatrist regularly      Last diabetic eye exam was June 2022  At home take Xalatan eye drops        Does check blood sugars routinely, but did not bring in blood sugar logs into the office today  States that blood sugars are typically in the low 100s when he checks      He has hyperlipidemia and takes simvastatin 40 mg daily  [facility] denies chest pain or shortness of breath      He has hypertension and takes lisinopril 10 mg daily  [facility] denies headache   He denies stroke-like symptoms  Review of Systems   Constitutional: Negative for chills, fatigue and fever  HENT: Negative for congestion, ear pain, hearing loss, rhinorrhea and sore throat  Eyes: Negative for pain and visual disturbance  Respiratory: Negative for cough, shortness of breath and wheezing  Cardiovascular: Negative for chest pain, palpitations and leg swelling  Gastrointestinal: Negative for abdominal pain, blood in stool, constipation, diarrhea, nausea and vomiting  Endocrine: Negative for cold intolerance, heat intolerance and polyuria  Genitourinary: Negative for difficulty urinating, dysuria, frequency, hematuria and urgency  Musculoskeletal: Negative for arthralgias, joint swelling and myalgias  Skin: Negative for rash and wound  Neurological: Negative for dizziness, syncope, weakness, numbness and headaches  Psychiatric/Behavioral: Negative for dysphoric mood and sleep disturbance  The patient is not nervous/anxious          Historical Information   Past Medical History:   Diagnosis Date   • Depression    • Diabetic frozen shoulder associated with type 2 diabetes mellitus (HCC)     bilaterally   • Glaucoma    • [address] [address] )      Past Surgical History:   Procedure Laterality Date   • TOOTH EXTRACTION       Social History   Social History     Substance and Sexual Activity   Alcohol Use Yes    Comment: ocasionally     Social History     Substance and Sexual Activity   Drug Use No     Social History     Tobacco Use   Smoking Status Every Day   • Packs/day: 1 00   • Types: Cigarettes   Smokeless Tobacco Never     Family History:   Family History   Problem Relation Age of Onset   • Breast cancer Mother    • Diabetes unspecified Father    • Hypertension Father    • Glaucoma Father    • No Known Problems Brother    • Diabetes unspecified Paternal Aunt    • No Known Problems Brother        Meds/Allergies   Current Outpatient Medications   Medication Sig Dispense Refill   • Alcohol Swabs (Ultra-Care Alcohol Prep Pads) 70 % PADS USE FIVE TIMES DAILY 100 each 5   • benztropine (COGENTIN) 1 mg tablet Take 1 tablet (1 mg total) by mouth 2 (two) times a day as needed for tremors 60 tablet 1   • clonazePAM (KlonoPIN) 1 mg tablet Take 2 tablets (2 mg total) by mouth daily at bedtime Do not start before January 7, 2023  60 tablet 1   • Empagliflozin 25 MG TABS Take 1 tablet (25 mg total) by mouth every morning 90 tablet 1   • escitalopram (Lexapro) 10 mg tablet Take 1 tablet (10 mg total) by mouth daily 30 tablet 1   • gabapentin (Neurontin) 100 mg capsule Take 1 capsule (100 mg total) by mouth 2 (two) times a day as needed (anxiety) 60 capsule 1   • gabapentin (Neurontin) 600 MG tablet Take 1 tablet (600 mg total) by mouth daily at bedtime 30 tablet 1   • insulin lispro (HumaLOG KwikPen) 100 units/mL injection pen INJECT FOUR UNITS SUBCUTANEOUSLY AT BREAKFAST, FOUR UNITS AT LUNCH, AND FOUR UNITS AT DINNER, AND INJECT EXTRA IF SUGAR OVER 200 15 mL 1   • Insulin Pen Needle (Pro Comfort Pen Needles) 32G X 6 MM MISC Use to give insulin 4 times a day 200 each 6   • Lantus SoloStar 100 units/mL injection pen Inject 18 Units under the skin daily (Patient taking differently: Inject 16 Units under the skin daily) 15 mL 2   • latanoprost (XALATAN) 0 005 % ophthalmic solution Administer 1 drop to both eyes daily at bedtime     • lisinopril (ZESTRIL) 10 mg tablet Take 1 tablet (10 mg total) by mouth daily 90 tablet 3   • metFORMIN (GLUCOPHAGE) 500 mg tablet Take 2 tablets (1,000 mg total) by mouth 2 (two) times a day with meals 360 tablet 1   • QUEtiapine (SEROquel) 400 MG tablet Take 2 tablets (800 mg total) by mouth daily at bedtime 60 tablet 1   • simvastatin (ZOCOR) 40 mg tablet Take 1 tablet (40 mg total) by mouth daily at bedtime 90 tablet 3   • True Metrix Blood Glucose Test test strip USE AS DIRECTED UP TO FIVE times a  each 2   • clonazePAM (KlonoPIN) 0 5 mg tablet 2 mg daily at bedtime   (Patient not taking: Reported on 10/17/2022)       No current facility-administered medications for this visit  No Known Allergies    Objective   Vitals: Blood pressure 112/70, height 6' 1" (1 854 m), weight 93 9 kg (207 lb)  Physical Exam  Vitals and nursing note reviewed  Constitutional:       General: He is not in acute distress  Appearance: Normal appearance  He is not diaphoretic  HENT:      Head: Normocephalic and atraumatic  Eyes:      General: No scleral icterus  Extraocular Movements: Extraocular movements intact  Conjunctiva/sclera: Conjunctivae normal       Pupils: Pupils are equal, round, and reactive to light  Cardiovascular:      Rate and Rhythm: Normal rate and regular rhythm  Heart sounds: No murmur heard  Pulmonary:      Effort: Pulmonary effort is normal  No respiratory distress  Breath sounds: Normal breath sounds  No wheezing  Musculoskeletal:      Cervical back: Normal range of motion        Right lower leg: No edema  Left lower leg: No edema  Lymphadenopathy:      Cervical: No cervical adenopathy  Skin:     General: Skin is warm and dry  Neurological:      Mental Status: He is alert and oriented to person, place, and time  Mental status is at baseline  Sensory: No sensory deficit  Gait: Gait normal    Psychiatric:         Mood and Affect: Mood normal          Behavior: Behavior normal          Thought Content: Thought content normal          The history was obtained from the review of the chart, patient  Lab Results:   Lab Results   Component Value Date/Time    Hemoglobin A1C 7 4 (H) 01/24/2023 09:00 AM    Hemoglobin A1C 7 3 (H) 09/26/2022 10:18 AM    Hemoglobin A1C 7 5 (H) 05/13/2022 09:53 AM    BUN 10 01/24/2023 09:00 AM    BUN 13 09/26/2022 10:18 AM    BUN 19 05/13/2022 09:53 AM    Potassium 4 4 01/24/2023 09:00 AM    Potassium 4 4 09/26/2022 10:18 AM    Potassium 3 8 05/13/2022 09:53 AM    Chloride 103 01/24/2023 09:00 AM    Chloride 106 09/26/2022 10:18 AM    Chloride 104 05/13/2022 09:53 AM    CO2 27 01/24/2023 09:00 AM    CO2 26 09/26/2022 10:18 AM    CO2 27 05/13/2022 09:53 AM    Creatinine 0 93 01/24/2023 09:00 AM    Creatinine 0 88 09/26/2022 10:18 AM    Creatinine 0 86 05/13/2022 09:53 AM    AST 12 01/24/2023 09:00 AM    AST 12 09/26/2022 10:18 AM    AST 15 05/13/2022 09:53 AM    ALT 10 01/24/2023 09:00 AM    ALT 11 09/26/2022 10:18 AM    ALT 15 05/13/2022 09:53 AM    Albumin 4 4 01/24/2023 09:00 AM    Albumin 4 5 09/26/2022 10:18 AM    Albumin 4 2 05/13/2022 09:53 AM    Globulin 2 1 01/24/2023 09:00 AM    Globulin 2 3 09/26/2022 10:18 AM    Globulin 2 2 05/13/2022 09:53 AM    HDL 49 09/26/2022 10:18 AM    Triglycerides 113 09/26/2022 10:18 AM         Portions of the record may have been created with voice recognition software  Occasional wrong word or "sound a like" substitutions may have occurred due to the inherent limitations of voice recognition software   Read the chart carefully and recognize, using context, where substitutions have occurred

## 2023-02-01 NOTE — PROGRESS NOTES
Services with current therapist Sasha Frederick LCSW began over 4 years ago  Diagnosis: Schizophrenia, VANGIE, Incomnia, Social Anxiety    Medicare, [place]    Jr Jimenez was encouraged to leave the [facility] residential program due to conflicts with neighbors  He is currently living at [facility] with financial assistance from his parents who live in the area  He likes [address] - "more quiet and peaceful"  Reoccurring problems with poor sleep which make his psychosis worse  Jr Jimenez would benefit from a CPS in addition to therapy  [facility] medical provider - Jamari GRIFFITH    Current therapist is retiring on 2/16/23  No more scheduled sessions since he is currently stable  Jr Jimenez needs to be a priority transfer versus being on a wait list because of risk of relapse and need for monitoring  Routed to [facility] Deepthi Aldrich Lorita Axon

## 2023-02-01 NOTE — PATIENT INSTRUCTIONS
Continue to monitor diet, and maintain physical activity  Continue with metformin 500 mg 2 tablets twice a day, Jardiance 25 mg daily  Increase Lantus to 18 units daily  Hold Humalog for now  To make sure to check blood sugar 2 or more times a day  Please send in blood sugar logs in 2 weeks so we can make adjustments to medication if needed  Continue with cholesterol and blood pressure medication  Follow-up in 3 months with lab work prior to visit

## 2023-02-15 ENCOUNTER — TELEPHONE (OUTPATIENT)
Dept: PSYCHIATRY | Facility: CLINIC | Age: 53
End: 2023-02-15

## 2023-02-16 DIAGNOSIS — F41.1 GAD (GENERALIZED ANXIETY DISORDER): ICD-10-CM

## 2023-02-16 DIAGNOSIS — R68.2 DRY MOUTH: ICD-10-CM

## 2023-02-16 DIAGNOSIS — F20.0 SCHIZOPHRENIA, PARANOID TYPE (HCC): ICD-10-CM

## 2023-02-16 NOTE — TELEPHONE ENCOUNTER
Medication Refill Request     Name of Medication Escitalopram  Dose/Frequency 10 mg/1 tab a day  Quantity 30tabs  Verified pharmacy   [x]  Verified ordering Provider   [x]  Does patient have enough for the next 3 days? Yes [] No [x]  Does patient have a follow-up appointment scheduled? Yes [x] No []   If so when is appointment: [facility]  ---------------    Name of Medication Gabapentin  Dose/Frequency 100 mg/2x a day as needed  Quantity 60 caps  Verified pharmacy   [x]  Verified ordering Provider   [x]  Does patient have enough for the next 3 days? Yes [] No [x]  Does patient have a follow-up appointment scheduled? Yes [x] No []   If so when is appointment: [facility]  -----------    Name of Medication Gabapentin  Dose/Frequency 600 [email or web address]  Quantity 30 tabs  Verified pharmacy   [x]  Verified ordering Provider   [x]  Does patient have enough for the next 3 days? Yes [] No [x]  Does patient have a follow-up appointment scheduled? Yes [x] No []   If so when is appointment: [facility]  ----------  Name of Medication Quetiapine  Dose/Frequency 400 mg/2 tabs @bed   Quantity 60 tabs  Verified pharmacy   [x]  Verified ordering Provider   [x]  Does patient have enough for the next 3 days? Yes [] No [x]  Does patient have a follow-up appointment scheduled?  Yes [x] No []   If so when is appointment: [facility]

## 2023-02-17 RX ORDER — ESCITALOPRAM OXALATE 10 MG/1
10 TABLET ORAL DAILY
Qty: 30 TABLET | Refills: 1 | Status: SHIPPED | OUTPATIENT
Start: 2023-02-17 | End: 2023-02-20 | Stop reason: SDUPTHER

## 2023-02-17 RX ORDER — QUETIAPINE FUMARATE 400 MG/1
800 TABLET, FILM COATED ORAL
Qty: 60 TABLET | Refills: 1 | Status: SHIPPED | OUTPATIENT
Start: 2023-02-17 | End: 2023-02-20 | Stop reason: SDUPTHER

## 2023-02-17 RX ORDER — GABAPENTIN 600 MG/1
600 TABLET ORAL
Qty: 30 TABLET | Refills: 1 | Status: SHIPPED | OUTPATIENT
Start: 2023-02-17 | End: 2023-02-20 | Stop reason: SDUPTHER

## 2023-02-17 RX ORDER — GABAPENTIN 100 MG/1
100 CAPSULE ORAL 2 TIMES DAILY PRN
Qty: 60 CAPSULE | Refills: 1 | Status: SHIPPED | OUTPATIENT
Start: 2023-02-17 | End: 2023-02-20 | Stop reason: SDUPTHER

## 2023-02-20 ENCOUNTER — OFFICE VISIT (OUTPATIENT)
Dept: PSYCHIATRY | Facility: CLINIC | Age: 53
End: 2023-02-20

## 2023-02-20 DIAGNOSIS — F20.0 SCHIZOPHRENIA, PARANOID TYPE (HCC): ICD-10-CM

## 2023-02-20 DIAGNOSIS — F40.10 SOCIAL ANXIETY DISORDER: Primary | ICD-10-CM

## 2023-02-20 DIAGNOSIS — F41.1 GAD (GENERALIZED ANXIETY DISORDER): ICD-10-CM

## 2023-02-20 RX ORDER — GABAPENTIN 600 MG/1
600 TABLET ORAL
Qty: 30 TABLET | Refills: 1 | Status: SHIPPED | OUTPATIENT
Start: 2023-02-20

## 2023-02-20 RX ORDER — BENZTROPINE MESYLATE 1 MG/1
1 TABLET ORAL 2 TIMES DAILY PRN
Qty: 60 TABLET | Refills: 1 | Status: SHIPPED | OUTPATIENT
Start: 2023-02-20

## 2023-02-20 RX ORDER — CLONAZEPAM 1 MG/1
2 TABLET ORAL
Qty: 60 TABLET | Refills: 1 | Status: SHIPPED | OUTPATIENT
Start: 2023-02-20

## 2023-02-20 RX ORDER — QUETIAPINE FUMARATE 400 MG/1
800 TABLET, FILM COATED ORAL
Qty: 60 TABLET | Refills: 1 | Status: SHIPPED | OUTPATIENT
Start: 2023-02-20

## 2023-02-20 RX ORDER — GABAPENTIN 100 MG/1
100 CAPSULE ORAL 2 TIMES DAILY PRN
Qty: 60 CAPSULE | Refills: 1 | Status: SHIPPED | OUTPATIENT
Start: 2023-02-20

## 2023-02-20 RX ORDER — ESCITALOPRAM OXALATE 10 MG/1
10 TABLET ORAL DAILY
Qty: 30 TABLET | Refills: 1 | Status: SHIPPED | OUTPATIENT
Start: 2023-02-20
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
## 2023-03-05 DIAGNOSIS — E11.69 TYPE 2 DIABETES MELLITUS WITH OTHER SPECIFIED COMPLICATION, WITH LONG-TERM CURRENT USE OF INSULIN (HCC): ICD-10-CM

## 2023-03-05 DIAGNOSIS — E11.49 OTHER DIABETIC NEUROLOGICAL COMPLICATION ASSOCIATED WITH TYPE 2 DIABETES MELLITUS (HCC): ICD-10-CM

## 2023-03-05 DIAGNOSIS — Z79.4 TYPE 2 DIABETES MELLITUS WITH OTHER SPECIFIED COMPLICATION, WITH LONG-TERM CURRENT USE OF INSULIN (HCC): ICD-10-CM

## 2023-03-06 RX ORDER — CALCIUM CITRATE/VITAMIN D3 200MG-6.25
TABLET ORAL
Qty: 600 EACH | Refills: 1 | Status: SHIPPED | OUTPATIENT
Start: 2023-03-06

## 2023-03-14 ENCOUNTER — TELEPHONE (OUTPATIENT)
Dept: PSYCHIATRY | Facility: CLINIC | Age: 53
End: 2023-03-14

## 2023-03-14 NOTE — TELEPHONE ENCOUNTER
Patient left message inquiring when next appointment is and stated he needed RF of all his meds  Returned call to pt at phone number on file and left message making aware that he has upcoming appt scheduled on 3/20 and RF on file at [place] [id number]

## 2023-03-19 DIAGNOSIS — E11.65 TYPE 2 DIABETES MELLITUS WITH HYPERGLYCEMIA, WITH LONG-TERM CURRENT USE OF INSULIN (HCC): ICD-10-CM

## 2023-03-19 DIAGNOSIS — E78.00 HYPERCHOLESTEROLEMIA: ICD-10-CM

## 2023-03-19 DIAGNOSIS — I10 HYPERTENSION, UNSPECIFIED TYPE: ICD-10-CM

## 2023-03-19 DIAGNOSIS — Z79.4 TYPE 2 DIABETES MELLITUS WITH HYPERGLYCEMIA, WITH LONG-TERM CURRENT USE OF INSULIN (HCC): ICD-10-CM

## 2023-03-20 RX ORDER — EMPAGLIFLOZIN 10 MG/1
TABLET, FILM COATED ORAL
Qty: 90 TABLET | Refills: 3 | Status: SHIPPED | OUTPATIENT
Start: 2023-03-20

## 2023-03-20 RX ORDER — SIMVASTATIN 40 MG
TABLET ORAL
Qty: 90 TABLET | Refills: 3 | Status: SHIPPED | OUTPATIENT
Start: 2023-03-20

## 2023-03-20 RX ORDER — LISINOPRIL 10 MG/1
TABLET ORAL
Qty: 90 TABLET | Refills: 3 | Status: SHIPPED | OUTPATIENT
Start: 2023-03-20

## 2023-03-24 ENCOUNTER — TELEPHONE (OUTPATIENT)
Dept: PSYCHIATRY | Facility: CLINIC | Age: 53
End: 2023-03-24

## 2023-03-24 NOTE — TELEPHONE ENCOUNTER
Pt called indicated he received his "no show" letter  Pt did not want to r/s appt - he only wanted to speak with provider

## 2023-03-30 DIAGNOSIS — E11.65 TYPE 2 DIABETES MELLITUS WITH HYPERGLYCEMIA, WITH LONG-TERM CURRENT USE OF INSULIN (HCC): ICD-10-CM

## 2023-03-30 DIAGNOSIS — Z79.4 TYPE 2 DIABETES MELLITUS WITH HYPERGLYCEMIA, WITH LONG-TERM CURRENT USE OF INSULIN (HCC): ICD-10-CM

## 2023-03-30 DIAGNOSIS — E11.69 TYPE 2 DIABETES MELLITUS WITH OTHER SPECIFIED COMPLICATION, WITH LONG-TERM CURRENT USE OF INSULIN (HCC): ICD-10-CM

## 2023-03-30 DIAGNOSIS — Z79.4 TYPE 2 DIABETES MELLITUS WITH OTHER SPECIFIED COMPLICATION, WITH LONG-TERM CURRENT USE OF INSULIN (HCC): ICD-10-CM

## 2023-03-30 DIAGNOSIS — E11.49 OTHER DIABETIC NEUROLOGICAL COMPLICATION ASSOCIATED WITH TYPE 2 DIABETES MELLITUS (HCC): ICD-10-CM

## 2023-03-30 RX ORDER — INSULIN GLARGINE 100 [IU]/ML
18 INJECTION, SOLUTION SUBCUTANEOUS DAILY
Qty: 15 ML | Refills: 0 | Status: SHIPPED | OUTPATIENT
Start: 2023-03-30

## 2023-04-03 ENCOUNTER — TELEPHONE (OUTPATIENT)
Dept: PSYCHIATRY | Facility: CLINIC | Age: 53
End: 2023-04-03

## 2023-04-03 NOTE — TELEPHONE ENCOUNTER
Retunred Patients phonecall  Patient lvm over the weekend indicating he wanted to change providers  LVM @12:06 to give us a call back
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
## 2023-04-04 ENCOUNTER — TELEPHONE (OUTPATIENT)
Dept: PSYCHIATRY | Facility: CLINIC | Age: 53
End: 2023-04-04

## 2023-04-04 NOTE — TELEPHONE ENCOUNTER
Patient called states he will be leaving [facility] and would like a  List of providers  I called back and left voicemail to return my call  We are unable to give him a list he would need to reach out to his insurance

## 2023-04-04 NOTE — TELEPHONE ENCOUNTER
Patient called back and asked to speak directly to [facility], I told him we do not have a direct ext to her  I did explain that I did leave a message and he would have to contact his insurance and see who is under his insurance, he procedded to tell me no she has to and I said she can not do that  He hung up on me  He wants and appt with [facility] so she can do this for him  I did let the other Mrs and Intake know not to schedule with her currently due to the fact that, that is what he wants his appointment for

## 2023-04-06 ENCOUNTER — TELEPHONE (OUTPATIENT)
Dept: PSYCHIATRY | Facility: CLINIC | Age: 53
End: 2023-04-06

## 2023-04-06 NOTE — TELEPHONE ENCOUNTER
Received LUCIO from [facility] mental health     [address] [place] [address]  F: [phone]

## 2023-04-19 ENCOUNTER — TELEPHONE (OUTPATIENT)
Dept: PSYCHIATRY | Facility: CLINIC | Age: 53
End: 2023-04-19

## 2023-04-19 NOTE — TELEPHONE ENCOUNTER
[facility] pharmacy calling for RF of celexa  Medication not currently in patients chart  Returned call to pharmacy who stated they got a script last 5/22-6 months worth but state they just used up his last script last month and patient is requesting RF  Forwarding to [facility] for review as unclear if patient should be taking this medication still

## 2023-05-03 ENCOUNTER — TELEPHONE (OUTPATIENT)
Dept: PSYCHIATRY | Facility: CLINIC | Age: 53
End: 2023-05-03

## 2023-05-03 ENCOUNTER — SOCIAL WORK (OUTPATIENT)
Dept: BEHAVIORAL/MENTAL HEALTH CLINIC | Facility: CLINIC | Age: 53
End: 2023-05-03

## 2023-05-03 DIAGNOSIS — F20.0 SCHIZOPHRENIA, PARANOID TYPE (HCC): Primary | ICD-10-CM
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
## 2023-05-05 ENCOUNTER — TELEPHONE (OUTPATIENT)
Dept: PSYCHIATRY | Facility: CLINIC | Age: 53
End: 2023-05-05

## 2023-05-05 NOTE — TELEPHONE ENCOUNTER
Patient was admitted to [facility],   Nurse is Helen Chow  LUCIO was signed and e-mailed medication list to nursing staff

## 2023-05-23 ENCOUNTER — TELEPHONE (OUTPATIENT)
Dept: PSYCHIATRY | Facility: CLINIC | Age: 53
End: 2023-05-23

## 2023-05-23 ENCOUNTER — OFFICE VISIT (OUTPATIENT)
Dept: PSYCHIATRY | Facility: CLINIC | Age: 53
End: 2023-05-23

## 2023-05-23 DIAGNOSIS — F40.10 SOCIAL ANXIETY DISORDER: ICD-10-CM

## 2023-05-23 DIAGNOSIS — F20.0 SCHIZOPHRENIA, PARANOID TYPE (HCC): Primary | ICD-10-CM

## 2023-05-23 DIAGNOSIS — F41.1 GAD (GENERALIZED ANXIETY DISORDER): ICD-10-CM
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
## 2023-05-23 NOTE — TELEPHONE ENCOUNTER
Patient signed LUCIO for :  [facility]   [facility] Charlette Turner

## 2023-06-05 LAB
ALBUMIN SERPL-MCNC: 4 G/DL (ref 3.6–5.1)
ALBUMIN/CREAT UR: NORMAL MCG/MG CREAT
ALBUMIN/GLOB SERPL: 1.8 (CALC) (ref 1–2.5)
ALP SERPL-CCNC: 75 U/L (ref 35–144)
ALT SERPL-CCNC: 10 U/L (ref 9–46)
AST SERPL-CCNC: 11 U/L (ref 10–35)
BILIRUB SERPL-MCNC: 0.5 MG/DL (ref 0.2–1.2)
BUN SERPL-MCNC: 11 MG/DL (ref 7–25)
BUN/CREAT SERPL: NORMAL (CALC) (ref 6–22)
CALCIUM SERPL-MCNC: 9.4 MG/DL (ref 8.6–10.3)
CHLORIDE SERPL-SCNC: 106 MMOL/L (ref 98–110)
CO2 SERPL-SCNC: 28 MMOL/L (ref 20–32)
CREAT SERPL-MCNC: 0.79 MG/DL (ref 0.7–1.3)
CREAT UR-MCNC: 31 MG/DL (ref 20–320)
GFR/BSA.PRED SERPLBLD CYS-BASED-ARV: 107 ML/MIN/1.73M2
GLOBULIN SER CALC-MCNC: 2.2 G/DL (CALC) (ref 1.9–3.7)
GLUCOSE SERPL-MCNC: 72 MG/DL (ref 65–139)
MICROALBUMIN UR-MCNC: <0.2 MG/DL
POTASSIUM SERPL-SCNC: 4.3 MMOL/L (ref 3.5–5.3)
PROT SERPL-MCNC: 6.2 G/DL (ref 6.1–8.1)
SODIUM SERPL-SCNC: 140 MMOL/L (ref 135–146)

## 2023-06-06 ENCOUNTER — TELEPHONE (OUTPATIENT)
Dept: PSYCHIATRY | Facility: CLINIC | Age: 53
End: 2023-06-06

## 2023-06-06 DIAGNOSIS — F41.1 GAD (GENERALIZED ANXIETY DISORDER): ICD-10-CM

## 2023-06-06 RX ORDER — CLONAZEPAM 1 MG/1
2 TABLET ORAL
Qty: 40 TABLET | Refills: 0 | Status: SHIPPED | OUTPATIENT
Start: 2023-06-06

## 2023-06-06 NOTE — TELEPHONE ENCOUNTER
Mom called said Patient needs to talk to [facility] he is having a psychotic break through please call back ASAP

## 2023-06-06 NOTE — TELEPHONE ENCOUNTER
Prior pt of Mireille-     Medication Refill Request     Name of Medication Klonopin  Dose/Frequency 2 tabs at bedtime  Quantity 40 tabs - enough to get him to his next appt  Verified pharmacy   [x]  Verified ordering Provider   [x]  Does patient have enough for the next 3 days? Yes [] No [x]  Does patient have a follow-up appointment scheduled?  Yes [x] No []   If so when is appointment: 6/23/23 @2pm Appt with Lady Paulino

## 2023-06-07 ENCOUNTER — TELEPHONE (OUTPATIENT)
Dept: BEHAVIORAL/MENTAL HEALTH CLINIC | Facility: CLINIC | Age: 53
End: 2023-06-07

## 2023-06-07 ENCOUNTER — TELEPHONE (OUTPATIENT)
Dept: PSYCHIATRY | Facility: CLINIC | Age: 53
End: 2023-06-07

## 2023-06-07 NOTE — TELEPHONE ENCOUNTER
Patient will be coming in to sign LUCIO's for Crisis Residential  Please make sure there is a LUCIO for each person the information needs to go to   I will pre print one and copies can be made for however many
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
## 2023-06-08 ENCOUNTER — TELEPHONE (OUTPATIENT)
Dept: PSYCHIATRY | Facility: CLINIC | Age: 53
End: 2023-06-08

## 2023-06-08 NOTE — TELEPHONE ENCOUNTER
Patient came in to sign LUCIO for [facility] for Crisis Residential

## 2023-06-08 NOTE — TELEPHONE ENCOUNTER
Patients mom called to speak with Isabel, I did advise mom that we no longer have a release on file for her and that I can still transfer to provider but provider can not release any information

## 2023-06-09 NOTE — TELEPHONE ENCOUNTER
Mom called patient got medication injection on   5/8/23 5/13/23 5/30/23 ( wasn't supposed to get till 6/9

## 2023-06-14 ENCOUNTER — TELEPHONE (OUTPATIENT)
Dept: PSYCHIATRY | Facility: CLINIC | Age: 53
End: 2023-06-14

## 2023-06-14 NOTE — TELEPHONE ENCOUNTER
"Call to [facility] [address] to confirm if client had picked up or received Invega injection from their pharmacy  Christian Base, Pharmacist, confirmed that client picked up Invega injection on 5/23/23  They did not administer injection, so he is unsure where he received, or if he took it at all  He states \"He will be due next week again though for a dose, and if he picks it up again, I will have a note in the chart that our pharmacy will administer for him  \" Advised that the Rx could be transferred here so that [facility] could administer, but pharmacist said that client has been going there a long time and will be receptive to their pharmacist administering the injection      "

## 2023-06-21 ENCOUNTER — TELEPHONE (OUTPATIENT)
Dept: PSYCHIATRY | Facility: CLINIC | Age: 53
End: 2023-06-21

## 2023-06-21 NOTE — TELEPHONE ENCOUNTER
Spoke with patient mom, he is currently still in [facility] in [place]  They will call once he is home to make a new appt

## 2023-08-29 ENCOUNTER — DOCUMENTATION (OUTPATIENT)
Dept: BEHAVIORAL/MENTAL HEALTH CLINIC | Facility: CLINIC | Age: 53
End: 2023-08-29

## 2023-08-30 NOTE — PROGRESS NOTES
Psychotherapy Discharge Summary    Preferred Name: Lia Barksdale  YOB: 1970    Admission date to psychotherapy: several years ago  Referred by: Unknown    Presenting Problem: Paranoid Schizophrenia    Course of treatment included : medication management, individual case management and individual therapy     Progress/Outcome of Treatment Goals (brief summary of course of treatment) This therapist saw client two times in office, first time meeting and second time resulted in immediate referral to inpatient acute care due to significant decompensation    Treatment Complications (if any): chronic psychosis, non compliant with medication, ETOH consumption    Treatment Progress: poor    Current [facility] Psychiatric Provider: Emi Haq    Discharge Medications include: Unknown    Discharge Date: 08/29/2023    Discharge Diagnosis: No diagnosis found.     Criteria for Discharge: receiving services at [facility]    Aftercare recommendations include (include specific referral names and phone numbers, if appropriate): [facility] where Ct is currently residing    Prognosis: fair

## 2024-01-02 ENCOUNTER — TELEPHONE (OUTPATIENT)
Dept: PSYCHIATRY | Facility: CLINIC | Age: 54
End: 2024-01-02

## 2024-01-02 NOTE — TELEPHONE ENCOUNTER
from inpatient facility called to inquire about [facility] therapy and psychiatry services. Writer explained the client was discharged from care in August 2023. He would need to go on a wait list to get care. Caller stated she would look elsewhere.

## 2024-02-14 ENCOUNTER — TELEMEDICINE (OUTPATIENT)
Dept: ENDOCRINOLOGY | Facility: HOSPITAL | Age: 54
End: 2024-02-14
Payer: MEDICARE

## 2024-02-14 DIAGNOSIS — E78.00 HYPERCHOLESTEROLEMIA: ICD-10-CM

## 2024-02-14 DIAGNOSIS — E11.65 TYPE 2 DIABETES MELLITUS WITH HYPERGLYCEMIA, WITH LONG-TERM CURRENT USE OF INSULIN (HCC): ICD-10-CM

## 2024-02-14 DIAGNOSIS — I10 HYPERTENSION, UNSPECIFIED TYPE: ICD-10-CM

## 2024-02-14 DIAGNOSIS — Z79.4 TYPE 2 DIABETES MELLITUS WITH OTHER SPECIFIED COMPLICATION, WITH LONG-TERM CURRENT USE OF INSULIN (HCC): ICD-10-CM

## 2024-02-14 DIAGNOSIS — E11.69 TYPE 2 DIABETES MELLITUS WITH OTHER SPECIFIED COMPLICATION, WITH LONG-TERM CURRENT USE OF INSULIN (HCC): ICD-10-CM

## 2024-02-14 DIAGNOSIS — E11.49 OTHER DIABETIC NEUROLOGICAL COMPLICATION ASSOCIATED WITH TYPE 2 DIABETES MELLITUS (HCC): ICD-10-CM

## 2024-02-14 DIAGNOSIS — Z79.4 TYPE 2 DIABETES MELLITUS WITH HYPERGLYCEMIA, WITH LONG-TERM CURRENT USE OF INSULIN (HCC): ICD-10-CM

## 2024-02-14 DIAGNOSIS — E11.42 DIABETIC POLYNEUROPATHY ASSOCIATED WITH TYPE 2 DIABETES MELLITUS (HCC): ICD-10-CM

## 2024-02-14 PROCEDURE — 99214 OFFICE O/P EST MOD 30 MIN: CPT | Performed by: PHYSICIAN ASSISTANT

## 2024-02-14 RX ORDER — INSULIN LISPRO 100 [IU]/ML
INJECTION, SOLUTION INTRAVENOUS; SUBCUTANEOUS
Qty: 15 ML | Refills: 1 | Status: SHIPPED | OUTPATIENT
Start: 2024-02-14

## 2024-02-14 RX ORDER — CITALOPRAM 20 MG/1
TABLET ORAL
COMMUNITY
Start: 2024-02-10

## 2024-02-14 RX ORDER — LISINOPRIL 10 MG/1
10 TABLET ORAL DAILY
Qty: 90 TABLET | Refills: 3 | Status: SHIPPED | OUTPATIENT
Start: 2024-02-14

## 2024-02-14 RX ORDER — INSULIN GLARGINE 100 [IU]/ML
20 INJECTION, SOLUTION SUBCUTANEOUS DAILY
Qty: 15 ML | Refills: 2 | Status: SHIPPED | OUTPATIENT
Start: 2024-02-14

## 2024-02-14 RX ORDER — SIMVASTATIN 40 MG
40 TABLET ORAL
Qty: 90 TABLET | Refills: 3 | Status: SHIPPED | OUTPATIENT
Start: 2024-02-14

## 2024-02-14 RX ORDER — PEN NEEDLE, DIABETIC 29 G X1/2"
NEEDLE, DISPOSABLE MISCELLANEOUS
COMMUNITY
Start: 2024-01-11

## 2024-02-14 RX ORDER — LOXAPINE SUCCINATE 10 MG/1
TABLET ORAL
COMMUNITY
Start: 2024-02-08

## 2024-02-14 RX ORDER — GLIPIZIDE 5 MG/1
TABLET ORAL EVERY 24 HOURS
COMMUNITY

## 2024-02-14 RX ORDER — GABAPENTIN 300 MG/1
300 CAPSULE ORAL 3 TIMES DAILY
COMMUNITY

## 2024-02-14 NOTE — PATIENT INSTRUCTIONS
I would like to restart him on the diabetes regimen he was on at his last office visit as blood sugars and A1c were doing well at that time.    He will take Lantus 20 units daily, Humalog 4 units with each meal.    Stop glipizide 5 mg daily.    Continue Jardiance 10 mg daily, and start metformin 500 mg 2 tablets with breakfast and 2 tablets with dinner.    Continue to have blood sugar checked at least 2 times a day.  If there is symptoms of hypoglycemia, please check at that time.    I would like blood sugar logs faxed over to the office in 2 weeks so I can make adjustments to his medications if needed.    Contact the office with any concerns or questions.    Follow-up in 3 months with lab work completed prior to visit.

## 2024-02-14 NOTE — PROGRESS NOTES
Virtual Regular Visit    Verification of patient location:    Patient is located at Other in the following state in which I hold an active license [place]      Assessment/Plan:    Problem List Items Addressed This Visit        Endocrine    Diabetic neuropathy associated with type 2 diabetes mellitus (HCC)    Relevant Medications    glipiZIDE (GLUCOTROL) 5 mg tablet    insulin lispro (HumaLOG KwikPen) 100 units/mL injection pen    Empagliflozin (Jardiance) 10 MG TABS tablet    Lantus SoloStar 100 units/mL SOPN    metFORMIN (GLUCOPHAGE) 500 mg tablet    Type 2 diabetes mellitus with hyperglycemia, with long-term current use of insulin (HCC)    Relevant Medications    glipiZIDE (GLUCOTROL) 5 mg tablet    insulin lispro (HumaLOG KwikPen) 100 units/mL injection pen    Empagliflozin (Jardiance) 10 MG TABS tablet    Lantus SoloStar 100 units/mL SOPN    metFORMIN (GLUCOPHAGE) 500 mg tablet    Other Relevant Orders    Comprehensive metabolic panel    Albumin / creatinine urine ratio    Hemoglobin A1C    Lipid panel    TSH, 3rd generation with Free T4 reflex       Cardiovascular and Mediastinum    Hypertension    Relevant Medications    lisinopril (ZESTRIL) 10 mg tablet   Other Visit Diagnoses     Type 2 diabetes mellitus with other specified complication, with long-term current use of insulin (HCC)        Relevant Medications    glipiZIDE (GLUCOTROL) 5 mg tablet    insulin lispro (HumaLOG KwikPen) 100 units/mL injection pen    Empagliflozin (Jardiance) 10 MG TABS tablet    Lantus SoloStar 100 units/mL SOPN    metFORMIN (GLUCOPHAGE) 500 mg tablet    Hypercholesterolemia        Relevant Medications    simvastatin (ZOCOR) 40 mg tablet      1.  Type 2 diabetes: No recent lab work, but blood sugar logs show that glucose levels are consistently running high.  Diabetic medication was significantly changed since his last office visit.  At this point I would like him to go back to what he was taking previously.  He will take Lantus 20  units daily, Humalog 4 units with meals, metformin 500 mg 2 tablets twice a day, and Jardiance 10 mg daily.  Continue checking blood sugar 2 or more times a day.  Send in blood sugar logs in 2 weeks.  Contact the office if there is any concerns or questions.  Follow-up in 3 months with lab work completed prior to visit.    2.  Diabetic neuropathy: Symptoms stable, does need a diabetic foot exam but unable to do today as this was a virtual visit.    3.  Hypertension: Reports that blood pressure has been normal.  Continue with lisinopril at this time.  Repeat CMP prior to next office visit.    4.  Hyperlipidemia: Is due for lipid panel.  Will have him repeat lab work prior to next office visit.  At this time he will continue with simvastatin 40 mg daily.         Reason for visit is type 2 diabetes follow-up.  Chief Complaint   Patient presents with   • Virtual Regular Visit          Encounter provider Bandar Montez PA-C    Provider located at [facility] [address] [address]      Recent Visits  No visits were found meeting these conditions.  Showing recent visits within past 7 days and meeting all other requirements  Today's Visits  Date Type Provider Dept   02/14/24 Telemedicine Bandar Montez PA-C [facility] [place]   Showing today's visits and meeting all other requirements  Future Appointments  No visits were found meeting these conditions.  Showing future appointments within next 150 days and meeting all other requirements       The patient was identified by name and date of birth. Mendez Villafuerte was informed that this is a telemedicine visit and that the visit is being conducted through the Epic Embedded platform. He agrees to proceed..  My office door was closed. No one else was in the room.  He acknowledged consent and understanding of privacy and security of  the video platform. The patient has agreed to participate and understands they can discontinue the visit at any time.    Patient is aware this is a billable service.     Fay Villafuerte is a 53 y.o. male with type 2 diabetes, insulin requiring with diabetic neuropathy, hypertension, hyperlipidemia for follow-up visit.  He was lost to follow-up and has not been seen since February 2023.  There has been concerns with uncontrolled mental illness, and also homelessness which is a factor into this.  It is unclear exactly what the history has been, but his diabetes medications has been adjusted since his last office visit.  He was diagnosed with type 2 diabetes at least 25 years ago and takes insulin and oral agents.  He utilizes Lantus 20 units daily, Jardiance 10 mg daily, glipizide 5 mg daily.  At his last office visit he was utilizing metformin 500 mg 2 tablets twice a day, Jardiance 25 mg daily, Lantus 16 units daily, Humalog 4 units with meals.  He denies polyuria, polydipsia, polyphagia, or nocturia.  He denies blurry vision.  He denies numbness or tingling of the feet.  He denies chest pain or shortness of breath.  States that he remains physically active.  Unfortunately his glucose levels have been consistently running high.     He did see a podiatrist for diabetic foot care in February 2022. He does see a podiatrist regularly.  Unable to do a diabetic foot exam today due to virtual visit.     Last diabetic eye exam was June 2022. At home take Xalatan eye drops.       Is currently having blood sugars checked twice a day.  Glucose levels are typically ranging between the high 100s and low 300s.     He has hyperlipidemia and takes simvastatin 40 mg daily.  He denies chest pain or shortness of breath.     He has hypertension and takes lisinopril 10 mg daily.  He denies headache.  He denies stroke-like symptoms.      Past Medical History:   Diagnosis Date   • Depression    • Diabetic frozen shoulder  "associated with type 2 diabetes mellitus      bilaterally   • Glaucoma    • Schizophrenia (HCC)        Past Surgical History:   Procedure Laterality Date   • TOOTH EXTRACTION         Current Outpatient Medications   Medication Sig Dispense Refill   • Alcohol Swabs (Ultra-Care Alcohol Prep Pads) 70 % PADS USE FIVE TIMES DAILY 100 each 5   • BD Insulin Syringe U/F 31G X 5/16\" 0.5 ML MISC      • benztropine (COGENTIN) 1 mg tablet Take 1 tablet (1 mg total) by mouth 2 (two) times a day as needed for tremors 60 tablet 1   • citalopram (CeleXA) 20 mg tablet      • Easy Comfort Pen Needles 33G X 6 MM MISC Use to give insulin 4 times a day 200 each 6   • Empagliflozin (Jardiance) 10 MG TABS tablet Take 1 tablet (10 mg total) by mouth daily 90 tablet 3   • gabapentin (NEURONTIN) 300 mg capsule Take 300 mg by mouth 3 (three) times a day     • glipiZIDE (GLUCOTROL) 5 mg tablet every 24 hours     • insulin lispro (HumaLOG KwikPen) 100 units/mL injection pen Inject 4 units subcutaneously before each meal. 15 mL 1   • Lantus SoloStar 100 units/mL SOPN Inject 0.2 mL (20 Units total) under the skin daily 15 mL 2   • lisinopril (ZESTRIL) 10 mg tablet Take 1 tablet (10 mg total) by mouth daily 90 tablet 3   • loxapine (LOXITANE) 10 mg capsule      • metFORMIN (GLUCOPHAGE) 500 mg tablet Take 2 tablets prior to breakfast and dinner. 360 tablet 1   • QUEtiapine (SEROquel) 400 MG tablet Take 2 tablets (800 mg total) by mouth daily at bedtime (Patient taking differently: Take 200 mg by mouth daily at bedtime) 60 tablet 1   • simvastatin (ZOCOR) 40 mg tablet Take 1 tablet (40 mg total) by mouth daily at bedtime 90 tablet 3   • True Metrix Blood Glucose Test test strip USE AS DIRECTED UP TO FIVE times a  each 1   • clonazePAM (KlonoPIN) 1 mg tablet Take 2 tablets (2 mg total) by mouth daily at bedtime (Patient not taking: Reported on 2/14/2024) 40 tablet 0   • escitalopram (Lexapro) 10 mg tablet Take 1 tablet (10 mg total) by mouth " daily (Patient not taking: Reported on 2/14/2024) 30 tablet 1   • gabapentin (Neurontin) 100 mg capsule Take 1 capsule (100 mg total) by mouth 2 (two) times a day as needed (anxiety) (Patient not taking: Reported on 2/14/2024) 60 capsule 1   • gabapentin (Neurontin) 600 MG tablet Take 1 tablet (600 mg total) by mouth daily at bedtime (Patient not taking: Reported on 2/14/2024) 30 tablet 1   • latanoprost (XALATAN) 0.005 % ophthalmic solution Administer 1 drop to both eyes daily at bedtime (Patient not taking: Reported on 2/14/2024)       No current facility-administered medications for this visit.        No Known Allergies    Review of Systems   Constitutional:  Negative for chills, fatigue and fever.   HENT:  Negative for congestion, ear pain, hearing loss, rhinorrhea and sore throat.    Eyes:  Negative for pain and visual disturbance.   Respiratory:  Negative for cough, shortness of breath and wheezing.    Cardiovascular:  Negative for chest pain, palpitations and leg swelling.   Gastrointestinal:  Negative for abdominal pain, blood in stool, constipation, diarrhea, nausea and vomiting.   Endocrine: Negative for cold intolerance, heat intolerance and polyuria.   Genitourinary:  Negative for difficulty urinating, dysuria, frequency, hematuria and urgency.   Musculoskeletal:  Negative for arthralgias, joint swelling and myalgias.   Skin:  Negative for rash and wound.   Neurological:  Negative for dizziness, syncope, weakness, numbness and headaches.   Psychiatric/Behavioral:  Negative for dysphoric mood and sleep disturbance. The patient is not nervous/anxious.        Video Exam    There were no vitals filed for this visit.    Physical Exam  Constitutional:       General: He is not in acute distress.     Appearance: Normal appearance. He is not diaphoretic.   HENT:      Head: Normocephalic and atraumatic.   Eyes:      General: No scleral icterus.     Conjunctiva/sclera: Conjunctivae normal.   Pulmonary:      Effort:  Pulmonary effort is normal. No respiratory distress.      Breath sounds: Normal breath sounds. No wheezing.   Musculoskeletal:      Cervical back: Normal range of motion.   Neurological:      Mental Status: He is alert and oriented to person, place, and time. Mental status is at baseline.   Psychiatric:         Mood and Affect: Mood normal.         Behavior: Behavior normal.         Thought Content: Thought content normal.          Visit Time  Total Visit Duration: 15 minutes

## 2024-02-24 LAB
ALBUMIN SERPL-MCNC: 4.7 G/DL (ref 3.8–4.9)
ALBUMIN/CREAT UR: <7 MG/G CREAT (ref 0–29)
ALBUMIN/GLOB SERPL: 2.1 {RATIO} (ref 1.2–2.2)
ALP SERPL-CCNC: 102 IU/L (ref 44–121)
ALT SERPL-CCNC: 13 IU/L (ref 0–44)
AST SERPL-CCNC: 16 IU/L (ref 0–40)
BILIRUB SERPL-MCNC: 0.3 MG/DL (ref 0–1.2)
BUN SERPL-MCNC: 18 MG/DL (ref 6–24)
BUN/CREAT SERPL: 19 (ref 9–20)
CALCIUM SERPL-MCNC: 9.7 MG/DL (ref 8.7–10.2)
CHLORIDE SERPL-SCNC: 101 MMOL/L (ref 96–106)
CHOLEST SERPL-MCNC: 177 MG/DL (ref 100–199)
CO2 SERPL-SCNC: 23 MMOL/L (ref 20–29)
CREAT SERPL-MCNC: 0.96 MG/DL (ref 0.76–1.27)
CREAT UR-MCNC: 42.5 MG/DL
EGFR: 95 ML/MIN/1.73
GLOBULIN SER-MCNC: 2.2 G/DL (ref 1.5–4.5)
GLUCOSE SERPL-MCNC: 178 MG/DL (ref 70–99)
HBA1C MFR BLD: 10.2 % (ref 4.8–5.6)
HDLC SERPL-MCNC: 64 MG/DL
LDLC SERPL CALC-MCNC: 97 MG/DL (ref 0–99)
MICROALBUMIN UR-MCNC: <3 UG/ML
POTASSIUM SERPL-SCNC: 4.8 MMOL/L (ref 3.5–5.2)
PROT SERPL-MCNC: 6.9 G/DL (ref 6–8.5)
SL AMB VLDL CHOLESTEROL CALC: 16 MG/DL (ref 5–40)
SODIUM SERPL-SCNC: 139 MMOL/L (ref 134–144)
TRIGL SERPL-MCNC: 88 MG/DL (ref 0–149)
TSH SERPL DL<=0.005 MIU/L-ACNC: 1.86 UIU/ML (ref 0.45–4.5)

## 2024-03-04 ENCOUNTER — TELEPHONE (OUTPATIENT)
Dept: ENDOCRINOLOGY | Facility: HOSPITAL | Age: 54
End: 2024-03-04

## 2024-03-04 NOTE — TELEPHONE ENCOUNTER
Reviewed recent blood sugar logs.  Blood sugars are doing better, but he is still running high.  At this time would like to increase his Lantus to 24 units daily.  His Humalog would like to increase to 6 units with each meal.  And like to see another set of blood sugar logs in about 2 weeks.  Please contact the office sooner if there is any concerns.

## 2024-03-04 NOTE — TELEPHONE ENCOUNTER
The following comments are from his 2-23-24 result notes:    Most recent hemoglobin A1c was 10.2.  This is significantly high for him.  The rest of his lab work is doing well at this time.  It has been 2 weeks since his appointment, is there anyway to get blood sugar logs at this time.  Also does he know his plans for the future as he does not have a follow-up appointment scheduled at this time.      3-4-24:  Spoke to Geraldine from [facility].  Patient has been residing there for about a month. No plan yet of where he's going to be living after he's released    I informed her of the patient's last lab results and asked them to send us his blood sugar log.  It's a non-medical program but we can relay any change of medications to them.  (P) [phone]  (F) [phone]

## 2024-03-08 DIAGNOSIS — Z79.4 TYPE 2 DIABETES MELLITUS WITH HYPERGLYCEMIA, WITH LONG-TERM CURRENT USE OF INSULIN (HCC): Primary | ICD-10-CM

## 2024-03-08 DIAGNOSIS — E11.65 TYPE 2 DIABETES MELLITUS WITH HYPERGLYCEMIA, WITH LONG-TERM CURRENT USE OF INSULIN (HCC): Primary | ICD-10-CM

## 2024-03-08 RX ORDER — PEN NEEDLE, DIABETIC 31 GX5/16"
NEEDLE, DISPOSABLE MISCELLANEOUS
Qty: 400 EACH | Refills: 3 | Status: SHIPPED | OUTPATIENT
Start: 2024-03-08

## 2024-03-08 NOTE — TELEPHONE ENCOUNTER
Darlin from [facility] called and stated that the patient needed a refill on his pen needles

## 2024-07-11 DIAGNOSIS — Z79.4 TYPE 2 DIABETES MELLITUS WITH HYPERGLYCEMIA, WITH LONG-TERM CURRENT USE OF INSULIN (HCC): ICD-10-CM

## 2024-07-11 DIAGNOSIS — Z79.4 TYPE 2 DIABETES MELLITUS WITH OTHER SPECIFIED COMPLICATION, WITH LONG-TERM CURRENT USE OF INSULIN (HCC): ICD-10-CM

## 2024-07-11 DIAGNOSIS — E11.49 OTHER DIABETIC NEUROLOGICAL COMPLICATION ASSOCIATED WITH TYPE 2 DIABETES MELLITUS (HCC): ICD-10-CM

## 2024-07-11 DIAGNOSIS — E11.65 TYPE 2 DIABETES MELLITUS WITH HYPERGLYCEMIA, WITH LONG-TERM CURRENT USE OF INSULIN (HCC): ICD-10-CM

## 2024-07-11 DIAGNOSIS — E11.69 TYPE 2 DIABETES MELLITUS WITH OTHER SPECIFIED COMPLICATION, WITH LONG-TERM CURRENT USE OF INSULIN (HCC): ICD-10-CM

## 2024-11-26 DIAGNOSIS — E11.69 TYPE 2 DIABETES MELLITUS WITH OTHER SPECIFIED COMPLICATION, WITH LONG-TERM CURRENT USE OF INSULIN (HCC): ICD-10-CM

## 2024-11-26 DIAGNOSIS — E11.65 TYPE 2 DIABETES MELLITUS WITH HYPERGLYCEMIA, WITH LONG-TERM CURRENT USE OF INSULIN (HCC): ICD-10-CM

## 2024-11-26 DIAGNOSIS — Z79.4 TYPE 2 DIABETES MELLITUS WITH HYPERGLYCEMIA, WITH LONG-TERM CURRENT USE OF INSULIN (HCC): ICD-10-CM

## 2024-11-26 DIAGNOSIS — E11.49 OTHER DIABETIC NEUROLOGICAL COMPLICATION ASSOCIATED WITH TYPE 2 DIABETES MELLITUS (HCC): ICD-10-CM

## 2024-11-26 DIAGNOSIS — Z79.4 TYPE 2 DIABETES MELLITUS WITH OTHER SPECIFIED COMPLICATION, WITH LONG-TERM CURRENT USE OF INSULIN (HCC): ICD-10-CM

## 2025-02-11 ENCOUNTER — DOCUMENTATION (OUTPATIENT)
Dept: PSYCHIATRY | Facility: CLINIC | Age: 55
End: 2025-02-11

## 2025-02-11 NOTE — PSYCH
"Psychiatric Discharge Summary- Administrative Discharge     Admit Date: See H&P for admit date  Discharge Date: 02/11/25     Discharge Diagnosis:   Patient Active Problem List   Diagnosis    Type 2 diabetes mellitus with hyperglycemia, with long-term current use of insulin (HCC)    Diabetic neuropathy associated with type 2 diabetes mellitus (HCC)    Hyperlipidemia    Hypertension    Impingement syndrome of right shoulder    Impingement syndrome of left shoulder    Schizophrenia, paranoid type (HCC)    VANGIE (generalized anxiety disorder)    Social anxiety disorder        Treating Physician: See Chart      Presenting Problems/Pertinent Findings: See Initial H&P     Course of Treatment:See Most Recent Med Management Progress Note    The patient's primary treating provider is no longer with practice.  Patient has either not responded to outreach, has not been seen in over a year, or has indicated they plan to transfer care to a new provider.  The chart is being administratively closed at this time.  For treatment course, please request past records when patient was in treatment with primary provider..      Discharge Medications:   Current Outpatient Medications:     Alcohol Swabs (Ultra-Care Alcohol Prep Pads) 70 % PADS, USE FIVE TIMES DAILY, Disp: 100 each, Rfl: 5    BD Insulin Syringe U/F 31G X 5/16\" 0.5 ML MISC, , Disp: , Rfl:     benztropine (COGENTIN) 1 mg tablet, Take 1 tablet (1 mg total) by mouth 2 (two) times a day as needed for tremors, Disp: 60 tablet, Rfl: 1    citalopram (CeleXA) 20 mg tablet, , Disp: , Rfl:     clonazePAM (KlonoPIN) 1 mg tablet, Take 2 tablets (2 mg total) by mouth daily at bedtime (Patient not taking: Reported on 2/14/2024), Disp: 40 tablet, Rfl: 0    Empagliflozin (Jardiance) 10 MG TABS tablet, Take 1 tablet (10 mg total) by mouth daily, Disp: 90 tablet, Rfl: 3    escitalopram (Lexapro) 10 mg tablet, Take 1 tablet (10 mg total) by mouth daily (Patient not taking: Reported on 2/14/2024), " "Disp: 30 tablet, Rfl: 1    gabapentin (Neurontin) 100 mg capsule, Take 1 capsule (100 mg total) by mouth 2 (two) times a day as needed (anxiety) (Patient not taking: Reported on 2/14/2024), Disp: 60 capsule, Rfl: 1    gabapentin (NEURONTIN) 300 mg capsule, Take 300 mg by mouth 3 (three) times a day, Disp: , Rfl:     gabapentin (Neurontin) 600 MG tablet, Take 1 tablet (600 mg total) by mouth daily at bedtime (Patient not taking: Reported on 2/14/2024), Disp: 30 tablet, Rfl: 1    glipiZIDE (GLUCOTROL) 5 mg tablet, every 24 hours, Disp: , Rfl:     insulin lispro (HumaLOG KwikPen) 100 units/mL injection pen, Inject 4 units subcutaneously before each meal., Disp: 15 mL, Rfl: 1    Insulin Pen Needle (PEN NEEDLES 31GX5/16\") 31G X 8 MM MISC, Use to inject insulin 4 times a day, Disp: 400 each, Rfl: 3    Lantus SoloStar 100 units/mL SOPN, Inject 0.2 mL (20 Units total) under the skin daily, Disp: 15 mL, Rfl: 2    latanoprost (XALATAN) 0.005 % ophthalmic solution, Administer 1 drop to both eyes daily at bedtime (Patient not taking: Reported on 2/14/2024), Disp: , Rfl:     lisinopril (ZESTRIL) 10 mg tablet, Take 1 tablet (10 mg total) by mouth daily, Disp: 90 tablet, Rfl: 3    loxapine (LOXITANE) 10 mg capsule, , Disp: , Rfl:     metFORMIN (GLUCOPHAGE) 500 mg tablet, TAKE 2 TABLETS PRIOR TO BREAKFAST AND DINNER., Disp: 360 tablet, Rfl: 1    QUEtiapine (SEROquel) 400 MG tablet, Take 2 tablets (800 mg total) by mouth daily at bedtime (Patient taking differently: Take 200 mg by mouth daily at bedtime), Disp: 60 tablet, Rfl: 1    simvastatin (ZOCOR) 40 mg tablet, Take 1 tablet (40 mg total) by mouth daily at bedtime, Disp: 90 tablet, Rfl: 3    True Metrix Blood Glucose Test test strip, USE AS DIRECTED UP TO FIVE times a DAY, Disp: 600 each, Rfl: 1     "

## 2025-02-27 DIAGNOSIS — E11.49 OTHER DIABETIC NEUROLOGICAL COMPLICATION ASSOCIATED WITH TYPE 2 DIABETES MELLITUS (HCC): ICD-10-CM

## 2025-02-27 DIAGNOSIS — E11.42 DIABETIC POLYNEUROPATHY ASSOCIATED WITH TYPE 2 DIABETES MELLITUS (HCC): ICD-10-CM

## 2025-02-27 DIAGNOSIS — Z79.4 TYPE 2 DIABETES MELLITUS WITH HYPERGLYCEMIA, WITH LONG-TERM CURRENT USE OF INSULIN (HCC): ICD-10-CM

## 2025-02-27 DIAGNOSIS — E11.65 TYPE 2 DIABETES MELLITUS WITH HYPERGLYCEMIA, WITH LONG-TERM CURRENT USE OF INSULIN (HCC): ICD-10-CM

## 2025-02-27 DIAGNOSIS — E11.69 TYPE 2 DIABETES MELLITUS WITH OTHER SPECIFIED COMPLICATION, WITH LONG-TERM CURRENT USE OF INSULIN (HCC): ICD-10-CM

## 2025-02-27 DIAGNOSIS — Z79.4 TYPE 2 DIABETES MELLITUS WITH OTHER SPECIFIED COMPLICATION, WITH LONG-TERM CURRENT USE OF INSULIN (HCC): ICD-10-CM

## 2025-02-27 RX ORDER — INSULIN LISPRO 100 [IU]/ML
INJECTION, SOLUTION INTRAVENOUS; SUBCUTANEOUS
Qty: 15 ML | Refills: 1 | Status: SHIPPED | OUTPATIENT
Start: 2025-02-27

## 2025-02-27 RX ORDER — INSULIN GLARGINE 100 [IU]/ML
20 INJECTION, SOLUTION SUBCUTANEOUS DAILY
Qty: 15 ML | Refills: 2 | Status: SHIPPED | OUTPATIENT
Start: 2025-02-27

## 2025-02-27 RX ORDER — PEN NEEDLE, DIABETIC 31 GX5/16"
NEEDLE, DISPOSABLE MISCELLANEOUS
Qty: 400 EACH | Refills: 3 | Status: SHIPPED | OUTPATIENT
Start: 2025-02-27

## 2025-02-27 NOTE — TELEPHONE ENCOUNTER
"Patient calling in regards to     insulin lispro (HumaLOG KwikPen) 100 units/mL injection pen       Insulin Pen Needle (PEN NEEDLES 31GX5/16\") 31G X 8 MM MISC       Lantus SoloStar 100 units/mL SOPN     States he is out and needs all three prescriptions sent to [facility].    Please advise patient when prescriptions are sent as patient is out.    Patient has not been seen since 2/14/2024.     Patient currently scheduled for follow up appointment 4/10 and added to wait list.  "